# Patient Record
Sex: FEMALE | Race: WHITE | Employment: FULL TIME | URBAN - METROPOLITAN AREA
[De-identification: names, ages, dates, MRNs, and addresses within clinical notes are randomized per-mention and may not be internally consistent; named-entity substitution may affect disease eponyms.]

---

## 2018-05-20 ENCOUNTER — APPOINTMENT (OUTPATIENT)
Dept: CT IMAGING | Facility: CLINIC | Age: 33
DRG: 872 | End: 2018-05-20
Attending: INTERNAL MEDICINE
Payer: COMMERCIAL

## 2018-05-20 ENCOUNTER — APPOINTMENT (OUTPATIENT)
Dept: GENERAL RADIOLOGY | Facility: CLINIC | Age: 33
DRG: 872 | End: 2018-05-20
Attending: EMERGENCY MEDICINE
Payer: COMMERCIAL

## 2018-05-20 ENCOUNTER — HOSPITAL ENCOUNTER (INPATIENT)
Facility: CLINIC | Age: 33
LOS: 2 days | Discharge: HOME | DRG: 872 | End: 2018-05-22
Attending: EMERGENCY MEDICINE | Admitting: INTERNAL MEDICINE
Payer: COMMERCIAL

## 2018-05-20 DIAGNOSIS — M54.50 ACUTE RIGHT-SIDED LOW BACK PAIN WITHOUT SCIATICA: Primary | ICD-10-CM

## 2018-05-20 DIAGNOSIS — N10 ACUTE PYELONEPHRITIS: ICD-10-CM

## 2018-05-20 PROBLEM — N12 PYELONEPHRITIS: Status: ACTIVE | Noted: 2018-05-20

## 2018-05-20 LAB
ALBUMIN SERPL-MCNC: 3.6 G/DL (ref 3.4–5)
ALBUMIN UR-MCNC: 30 MG/DL
ALP SERPL-CCNC: 71 U/L (ref 40–150)
ALT SERPL W P-5'-P-CCNC: 21 U/L (ref 0–50)
ANION GAP SERPL CALCULATED.3IONS-SCNC: 12 MMOL/L (ref 3–14)
APPEARANCE UR: CLEAR
AST SERPL W P-5'-P-CCNC: 34 U/L (ref 0–45)
BACTERIA #/AREA URNS HPF: ABNORMAL /HPF
BASOPHILS # BLD AUTO: 0 10E9/L (ref 0–0.2)
BASOPHILS NFR BLD AUTO: 0.2 %
BILIRUB SERPL-MCNC: 0.5 MG/DL (ref 0.2–1.3)
BILIRUB UR QL STRIP: NEGATIVE
BUN SERPL-MCNC: 14 MG/DL (ref 7–30)
CALCIUM SERPL-MCNC: 7.9 MG/DL (ref 8.5–10.1)
CHLORIDE SERPL-SCNC: 103 MMOL/L (ref 94–109)
CO2 SERPL-SCNC: 21 MMOL/L (ref 20–32)
COLOR UR AUTO: YELLOW
CREAT SERPL-MCNC: 0.86 MG/DL (ref 0.52–1.04)
CREAT SERPL-MCNC: 0.87 MG/DL (ref 0.52–1.04)
CRP SERPL-MCNC: 86.5 MG/L (ref 0–8)
DIFFERENTIAL METHOD BLD: ABNORMAL
EOSINOPHIL # BLD AUTO: 0.1 10E9/L (ref 0–0.7)
EOSINOPHIL NFR BLD AUTO: 0.6 %
ERYTHROCYTE [DISTWIDTH] IN BLOOD BY AUTOMATED COUNT: 12.6 % (ref 10–15)
ERYTHROCYTE [SEDIMENTATION RATE] IN BLOOD BY WESTERGREN METHOD: 117 MM/H (ref 0–20)
GFR SERPL CREATININE-BSD FRML MDRD: 74 ML/MIN/1.7M2
GFR SERPL CREATININE-BSD FRML MDRD: 75 ML/MIN/1.7M2
GLUCOSE SERPL-MCNC: 96 MG/DL (ref 70–99)
GLUCOSE UR STRIP-MCNC: NEGATIVE MG/DL
HCT VFR BLD AUTO: 35.6 % (ref 35–47)
HGB BLD-MCNC: 12.4 G/DL (ref 11.7–15.7)
HGB UR QL STRIP: ABNORMAL
IMM GRANULOCYTES # BLD: 0 10E9/L (ref 0–0.4)
IMM GRANULOCYTES NFR BLD: 0.3 %
KETONES UR STRIP-MCNC: 10 MG/DL
LACTATE BLD-SCNC: 1.1 MMOL/L (ref 0.7–2)
LACTATE BLD-SCNC: 2.9 MMOL/L (ref 0.4–1.9)
LEUKOCYTE ESTERASE UR QL STRIP: ABNORMAL
LYMPHOCYTES # BLD AUTO: 1.5 10E9/L (ref 0.8–5.3)
LYMPHOCYTES NFR BLD AUTO: 9.3 %
MCH RBC QN AUTO: 31.2 PG (ref 26.5–33)
MCHC RBC AUTO-ENTMCNC: 34.8 G/DL (ref 31.5–36.5)
MCV RBC AUTO: 90 FL (ref 78–100)
MONOCYTES # BLD AUTO: 1.9 10E9/L (ref 0–1.3)
MONOCYTES NFR BLD AUTO: 12.1 %
MUCOUS THREADS #/AREA URNS LPF: PRESENT /LPF
NEUTROPHILS # BLD AUTO: 12.2 10E9/L (ref 1.6–8.3)
NEUTROPHILS NFR BLD AUTO: 77.5 %
NITRATE UR QL: NEGATIVE
PH UR STRIP: 5.5 PH (ref 5–7)
PLATELET # BLD AUTO: 194 10E9/L (ref 150–450)
PLATELET # BLD AUTO: 252 10E9/L (ref 150–450)
POTASSIUM SERPL-SCNC: 3.9 MMOL/L (ref 3.4–5.3)
PROT SERPL-MCNC: 9 G/DL (ref 6.8–8.8)
RBC # BLD AUTO: 3.97 10E12/L (ref 3.8–5.2)
RBC #/AREA URNS AUTO: 6 /HPF (ref 0–2)
SODIUM SERPL-SCNC: 136 MMOL/L (ref 133–144)
SOURCE: ABNORMAL
SP GR UR STRIP: 1.02 (ref 1–1.03)
SQUAMOUS #/AREA URNS AUTO: 1 /HPF (ref 0–1)
UROBILINOGEN UR STRIP-MCNC: NORMAL MG/DL (ref 0–2)
WBC # BLD AUTO: 15.7 10E9/L (ref 4–11)
WBC #/AREA URNS AUTO: 89 /HPF (ref 0–5)

## 2018-05-20 PROCEDURE — 85652 RBC SED RATE AUTOMATED: CPT | Performed by: EMERGENCY MEDICINE

## 2018-05-20 PROCEDURE — 25000128 H RX IP 250 OP 636: Performed by: INTERNAL MEDICINE

## 2018-05-20 PROCEDURE — 83605 ASSAY OF LACTIC ACID: CPT | Performed by: INTERNAL MEDICINE

## 2018-05-20 PROCEDURE — 85025 COMPLETE CBC W/AUTO DIFF WBC: CPT | Performed by: EMERGENCY MEDICINE

## 2018-05-20 PROCEDURE — 86140 C-REACTIVE PROTEIN: CPT | Performed by: EMERGENCY MEDICINE

## 2018-05-20 PROCEDURE — 80053 COMPREHEN METABOLIC PANEL: CPT | Performed by: EMERGENCY MEDICINE

## 2018-05-20 PROCEDURE — 87086 URINE CULTURE/COLONY COUNT: CPT | Performed by: EMERGENCY MEDICINE

## 2018-05-20 PROCEDURE — 25000125 ZZHC RX 250: Performed by: INTERNAL MEDICINE

## 2018-05-20 PROCEDURE — 25000132 ZZH RX MED GY IP 250 OP 250 PS 637: Performed by: INTERNAL MEDICINE

## 2018-05-20 PROCEDURE — 99223 1ST HOSP IP/OBS HIGH 75: CPT | Mod: AI | Performed by: INTERNAL MEDICINE

## 2018-05-20 PROCEDURE — 87186 SC STD MICRODIL/AGAR DIL: CPT | Performed by: EMERGENCY MEDICINE

## 2018-05-20 PROCEDURE — 12000000 ZZH R&B MED SURG/OB

## 2018-05-20 PROCEDURE — 85049 AUTOMATED PLATELET COUNT: CPT | Performed by: INTERNAL MEDICINE

## 2018-05-20 PROCEDURE — 36415 COLL VENOUS BLD VENIPUNCTURE: CPT | Performed by: INTERNAL MEDICINE

## 2018-05-20 PROCEDURE — 25000128 H RX IP 250 OP 636

## 2018-05-20 PROCEDURE — 82565 ASSAY OF CREATININE: CPT | Performed by: INTERNAL MEDICINE

## 2018-05-20 PROCEDURE — 99285 EMERGENCY DEPT VISIT HI MDM: CPT | Mod: 25

## 2018-05-20 PROCEDURE — 36415 COLL VENOUS BLD VENIPUNCTURE: CPT | Performed by: EMERGENCY MEDICINE

## 2018-05-20 PROCEDURE — 96375 TX/PRO/DX INJ NEW DRUG ADDON: CPT

## 2018-05-20 PROCEDURE — 25000128 H RX IP 250 OP 636: Performed by: EMERGENCY MEDICINE

## 2018-05-20 PROCEDURE — 83605 ASSAY OF LACTIC ACID: CPT | Performed by: EMERGENCY MEDICINE

## 2018-05-20 PROCEDURE — 74178 CT ABD&PLV WO CNTR FLWD CNTR: CPT

## 2018-05-20 PROCEDURE — 81001 URINALYSIS AUTO W/SCOPE: CPT | Performed by: EMERGENCY MEDICINE

## 2018-05-20 PROCEDURE — 96365 THER/PROPH/DIAG IV INF INIT: CPT

## 2018-05-20 PROCEDURE — 87040 BLOOD CULTURE FOR BACTERIA: CPT | Performed by: EMERGENCY MEDICINE

## 2018-05-20 PROCEDURE — 87088 URINE BACTERIA CULTURE: CPT | Performed by: EMERGENCY MEDICINE

## 2018-05-20 PROCEDURE — 87040 BLOOD CULTURE FOR BACTERIA: CPT | Performed by: INTERNAL MEDICINE

## 2018-05-20 PROCEDURE — 25000132 ZZH RX MED GY IP 250 OP 250 PS 637: Performed by: EMERGENCY MEDICINE

## 2018-05-20 PROCEDURE — 71046 X-RAY EXAM CHEST 2 VIEWS: CPT

## 2018-05-20 RX ORDER — AMOXICILLIN 250 MG
2 CAPSULE ORAL 2 TIMES DAILY PRN
Status: DISCONTINUED | OUTPATIENT
Start: 2018-05-20 | End: 2018-05-22 | Stop reason: HOSPADM

## 2018-05-20 RX ORDER — BENZONATATE 100 MG/1
100 CAPSULE ORAL 3 TIMES DAILY PRN
Status: DISCONTINUED | OUTPATIENT
Start: 2018-05-20 | End: 2018-05-22 | Stop reason: HOSPADM

## 2018-05-20 RX ORDER — NALOXONE HYDROCHLORIDE 0.4 MG/ML
.1-.4 INJECTION, SOLUTION INTRAMUSCULAR; INTRAVENOUS; SUBCUTANEOUS
Status: DISCONTINUED | OUTPATIENT
Start: 2018-05-20 | End: 2018-05-22 | Stop reason: HOSPADM

## 2018-05-20 RX ORDER — GUAIFENESIN 600 MG/1
600 TABLET, EXTENDED RELEASE ORAL 2 TIMES DAILY
Status: DISCONTINUED | OUTPATIENT
Start: 2018-05-20 | End: 2018-05-22 | Stop reason: HOSPADM

## 2018-05-20 RX ORDER — POTASSIUM CHLORIDE 1500 MG/1
20-40 TABLET, EXTENDED RELEASE ORAL
Status: DISCONTINUED | OUTPATIENT
Start: 2018-05-20 | End: 2018-05-22 | Stop reason: HOSPADM

## 2018-05-20 RX ORDER — LORATADINE 10 MG/1
10 TABLET ORAL DAILY
Status: DISCONTINUED | OUTPATIENT
Start: 2018-05-20 | End: 2018-05-22 | Stop reason: HOSPADM

## 2018-05-20 RX ORDER — LACTOBACILLUS RHAMNOSUS GG 10B CELL
1 CAPSULE ORAL 2 TIMES DAILY
Status: DISCONTINUED | OUTPATIENT
Start: 2018-05-20 | End: 2018-05-22 | Stop reason: HOSPADM

## 2018-05-20 RX ORDER — ACETAMINOPHEN 325 MG/1
650 TABLET ORAL EVERY 4 HOURS PRN
Status: DISCONTINUED | OUTPATIENT
Start: 2018-05-20 | End: 2018-05-22 | Stop reason: HOSPADM

## 2018-05-20 RX ORDER — METOCLOPRAMIDE HYDROCHLORIDE 5 MG/ML
10 INJECTION INTRAMUSCULAR; INTRAVENOUS EVERY 6 HOURS PRN
Status: DISCONTINUED | OUTPATIENT
Start: 2018-05-20 | End: 2018-05-22 | Stop reason: HOSPADM

## 2018-05-20 RX ORDER — CETIRIZINE HYDROCHLORIDE 10 MG/1
10 TABLET ORAL DAILY
Status: DISCONTINUED | OUTPATIENT
Start: 2018-05-20 | End: 2018-05-20

## 2018-05-20 RX ORDER — OXYCODONE HYDROCHLORIDE 5 MG/1
5 TABLET ORAL
Status: COMPLETED | OUTPATIENT
Start: 2018-05-20 | End: 2018-05-20

## 2018-05-20 RX ORDER — BISACODYL 10 MG
10 SUPPOSITORY, RECTAL RECTAL DAILY PRN
Status: DISCONTINUED | OUTPATIENT
Start: 2018-05-20 | End: 2018-05-22 | Stop reason: HOSPADM

## 2018-05-20 RX ORDER — AMOXICILLIN 250 MG
1 CAPSULE ORAL 2 TIMES DAILY PRN
Status: DISCONTINUED | OUTPATIENT
Start: 2018-05-20 | End: 2018-05-22 | Stop reason: HOSPADM

## 2018-05-20 RX ORDER — GUAIFENESIN 600 MG/1
600 TABLET, EXTENDED RELEASE ORAL 2 TIMES DAILY
COMMUNITY

## 2018-05-20 RX ORDER — ONDANSETRON 4 MG/1
4 TABLET, ORALLY DISINTEGRATING ORAL EVERY 6 HOURS PRN
Status: DISCONTINUED | OUTPATIENT
Start: 2018-05-20 | End: 2018-05-22 | Stop reason: HOSPADM

## 2018-05-20 RX ORDER — METOCLOPRAMIDE 10 MG/1
10 TABLET ORAL EVERY 6 HOURS PRN
Status: DISCONTINUED | OUTPATIENT
Start: 2018-05-20 | End: 2018-05-22 | Stop reason: HOSPADM

## 2018-05-20 RX ORDER — OXYCODONE HYDROCHLORIDE 5 MG/1
5 TABLET ORAL
Status: DISCONTINUED | OUTPATIENT
Start: 2018-05-20 | End: 2018-05-21

## 2018-05-20 RX ORDER — ONDANSETRON 2 MG/ML
4 INJECTION INTRAMUSCULAR; INTRAVENOUS EVERY 6 HOURS PRN
Status: DISCONTINUED | OUTPATIENT
Start: 2018-05-20 | End: 2018-05-22 | Stop reason: HOSPADM

## 2018-05-20 RX ORDER — ONDANSETRON 2 MG/ML
4 INJECTION INTRAMUSCULAR; INTRAVENOUS ONCE
Status: COMPLETED | OUTPATIENT
Start: 2018-05-20 | End: 2018-05-20

## 2018-05-20 RX ORDER — ONDANSETRON 2 MG/ML
INJECTION INTRAMUSCULAR; INTRAVENOUS
Status: COMPLETED
Start: 2018-05-20 | End: 2018-05-20

## 2018-05-20 RX ORDER — POTASSIUM CHLORIDE 1.5 G/1.58G
20-40 POWDER, FOR SOLUTION ORAL
Status: DISCONTINUED | OUTPATIENT
Start: 2018-05-20 | End: 2018-05-22 | Stop reason: HOSPADM

## 2018-05-20 RX ORDER — IOPAMIDOL 755 MG/ML
100 INJECTION, SOLUTION INTRAVASCULAR ONCE
Status: COMPLETED | OUTPATIENT
Start: 2018-05-20 | End: 2018-05-20

## 2018-05-20 RX ORDER — POLYETHYLENE GLYCOL 3350 17 G/17G
17 POWDER, FOR SOLUTION ORAL DAILY PRN
Status: DISCONTINUED | OUTPATIENT
Start: 2018-05-20 | End: 2018-05-22 | Stop reason: HOSPADM

## 2018-05-20 RX ORDER — SODIUM CHLORIDE 9 MG/ML
INJECTION, SOLUTION INTRAVENOUS CONTINUOUS
Status: DISCONTINUED | OUTPATIENT
Start: 2018-05-20 | End: 2018-05-22

## 2018-05-20 RX ORDER — MAGNESIUM SULFATE HEPTAHYDRATE 40 MG/ML
4 INJECTION, SOLUTION INTRAVENOUS EVERY 4 HOURS PRN
Status: DISCONTINUED | OUTPATIENT
Start: 2018-05-20 | End: 2018-05-22 | Stop reason: HOSPADM

## 2018-05-20 RX ORDER — LIDOCAINE 40 MG/G
CREAM TOPICAL
Status: DISCONTINUED | OUTPATIENT
Start: 2018-05-20 | End: 2018-05-20

## 2018-05-20 RX ORDER — LEVOFLOXACIN 5 MG/ML
750 INJECTION, SOLUTION INTRAVENOUS ONCE
Status: COMPLETED | OUTPATIENT
Start: 2018-05-20 | End: 2018-05-20

## 2018-05-20 RX ORDER — POTASSIUM CHLORIDE 29.8 MG/ML
20 INJECTION INTRAVENOUS
Status: DISCONTINUED | OUTPATIENT
Start: 2018-05-20 | End: 2018-05-22 | Stop reason: HOSPADM

## 2018-05-20 RX ORDER — LIDOCAINE 40 MG/G
CREAM TOPICAL
Status: DISCONTINUED | OUTPATIENT
Start: 2018-05-20 | End: 2018-05-22 | Stop reason: HOSPADM

## 2018-05-20 RX ORDER — IBUPROFEN 600 MG/1
600 TABLET, FILM COATED ORAL EVERY 6 HOURS PRN
Status: DISCONTINUED | OUTPATIENT
Start: 2018-05-20 | End: 2018-05-22 | Stop reason: HOSPADM

## 2018-05-20 RX ORDER — LORATADINE 10 MG/1
10 TABLET ORAL DAILY
COMMUNITY

## 2018-05-20 RX ORDER — POTASSIUM CL/LIDO/0.9 % NACL 10MEQ/0.1L
10 INTRAVENOUS SOLUTION, PIGGYBACK (ML) INTRAVENOUS
Status: DISCONTINUED | OUTPATIENT
Start: 2018-05-20 | End: 2018-05-22 | Stop reason: HOSPADM

## 2018-05-20 RX ORDER — POTASSIUM CHLORIDE 7.45 MG/ML
10 INJECTION INTRAVENOUS
Status: DISCONTINUED | OUTPATIENT
Start: 2018-05-20 | End: 2018-05-22 | Stop reason: HOSPADM

## 2018-05-20 RX ORDER — PYRIDOXINE HCL (VITAMIN B6) 100 MG
1 TABLET ORAL DAILY
COMMUNITY

## 2018-05-20 RX ORDER — PROCHLORPERAZINE 25 MG
25 SUPPOSITORY, RECTAL RECTAL EVERY 12 HOURS PRN
Status: DISCONTINUED | OUTPATIENT
Start: 2018-05-20 | End: 2018-05-22 | Stop reason: HOSPADM

## 2018-05-20 RX ORDER — LEVOFLOXACIN 5 MG/ML
500 INJECTION, SOLUTION INTRAVENOUS EVERY 24 HOURS
Status: DISCONTINUED | OUTPATIENT
Start: 2018-05-21 | End: 2018-05-22 | Stop reason: HOSPADM

## 2018-05-20 RX ORDER — IBUPROFEN 400 MG/1
800 TABLET, FILM COATED ORAL ONCE
Status: COMPLETED | OUTPATIENT
Start: 2018-05-20 | End: 2018-05-20

## 2018-05-20 RX ORDER — PROCHLORPERAZINE MALEATE 10 MG
10 TABLET ORAL EVERY 6 HOURS PRN
Status: DISCONTINUED | OUTPATIENT
Start: 2018-05-20 | End: 2018-05-22 | Stop reason: HOSPADM

## 2018-05-20 RX ORDER — MULTIPLE VITAMINS W/ MINERALS TAB 9MG-400MCG
1 TAB ORAL DAILY
COMMUNITY

## 2018-05-20 RX ORDER — LIDOCAINE 40 MG/G
CREAM TOPICAL 2 TIMES DAILY PRN
Status: DISCONTINUED | OUTPATIENT
Start: 2018-05-20 | End: 2018-05-22 | Stop reason: HOSPADM

## 2018-05-20 RX ORDER — MORPHINE SULFATE 4 MG/ML
6 INJECTION, SOLUTION INTRAMUSCULAR; INTRAVENOUS ONCE
Status: COMPLETED | OUTPATIENT
Start: 2018-05-20 | End: 2018-05-20

## 2018-05-20 RX ADMIN — POLYETHYLENE GLYCOL 3350 17 G: 17 POWDER, FOR SOLUTION ORAL at 16:07

## 2018-05-20 RX ADMIN — ONDANSETRON 4 MG: 2 INJECTION INTRAMUSCULAR; INTRAVENOUS at 01:52

## 2018-05-20 RX ADMIN — ACETAMINOPHEN 650 MG: 325 TABLET ORAL at 04:50

## 2018-05-20 RX ADMIN — LIDOCAINE: 40 CREAM TOPICAL at 04:50

## 2018-05-20 RX ADMIN — BENZONATATE 100 MG: 100 CAPSULE ORAL at 16:05

## 2018-05-20 RX ADMIN — PROCHLORPERAZINE EDISYLATE 10 MG: 5 INJECTION INTRAMUSCULAR; INTRAVENOUS at 16:00

## 2018-05-20 RX ADMIN — MORPHINE SULFATE 6 MG: 4 INJECTION, SOLUTION INTRAMUSCULAR; INTRAVENOUS at 01:42

## 2018-05-20 RX ADMIN — DEXTRAN 70, AND HYPROMELLOSE 2910 3 DROP: 1; 3 SOLUTION/ DROPS OPHTHALMIC at 09:57

## 2018-05-20 RX ADMIN — SODIUM CHLORIDE: 9 INJECTION, SOLUTION INTRAVENOUS at 17:13

## 2018-05-20 RX ADMIN — OXYCODONE HYDROCHLORIDE 5 MG: 5 TABLET ORAL at 16:05

## 2018-05-20 RX ADMIN — OXYCODONE HYDROCHLORIDE 5 MG: 5 TABLET ORAL at 21:30

## 2018-05-20 RX ADMIN — OXYCODONE HYDROCHLORIDE 5 MG: 5 TABLET ORAL at 03:53

## 2018-05-20 RX ADMIN — ACETAMINOPHEN 650 MG: 325 TABLET ORAL at 18:40

## 2018-05-20 RX ADMIN — IOPAMIDOL 100 ML: 755 INJECTION, SOLUTION INTRAVENOUS at 15:09

## 2018-05-20 RX ADMIN — SENNOSIDES AND DOCUSATE SODIUM 1 TABLET: 8.6; 5 TABLET ORAL at 16:06

## 2018-05-20 RX ADMIN — SODIUM CHLORIDE 60 ML: 9 INJECTION, SOLUTION INTRAVENOUS at 15:09

## 2018-05-20 RX ADMIN — SODIUM CHLORIDE 1000 ML: 9 INJECTION, SOLUTION INTRAVENOUS at 03:59

## 2018-05-20 RX ADMIN — BENZONATATE 100 MG: 100 CAPSULE ORAL at 08:14

## 2018-05-20 RX ADMIN — ACETAMINOPHEN 650 MG: 325 TABLET ORAL at 22:50

## 2018-05-20 RX ADMIN — CETIRIZINE HYDROCHLORIDE 10 MG: 10 TABLET, FILM COATED ORAL at 08:01

## 2018-05-20 RX ADMIN — PROCHLORPERAZINE EDISYLATE 10 MG: 5 INJECTION INTRAMUSCULAR; INTRAVENOUS at 04:14

## 2018-05-20 RX ADMIN — LEVOFLOXACIN 750 MG: 5 INJECTION, SOLUTION INTRAVENOUS at 02:26

## 2018-05-20 RX ADMIN — ACETAMINOPHEN 650 MG: 325 TABLET ORAL at 09:50

## 2018-05-20 RX ADMIN — LORATADINE 10 MG: 10 TABLET ORAL at 20:35

## 2018-05-20 RX ADMIN — Medication 1 CAPSULE: at 20:35

## 2018-05-20 RX ADMIN — ACETAMINOPHEN 650 MG: 325 TABLET ORAL at 13:51

## 2018-05-20 RX ADMIN — OXYCODONE HYDROCHLORIDE 5 MG: 5 TABLET ORAL at 04:50

## 2018-05-20 RX ADMIN — ONDANSETRON 4 MG: 2 INJECTION INTRAMUSCULAR; INTRAVENOUS at 12:00

## 2018-05-20 RX ADMIN — OXYCODONE HYDROCHLORIDE 5 MG: 5 TABLET ORAL at 11:49

## 2018-05-20 RX ADMIN — SODIUM CHLORIDE 1000 ML: 9 INJECTION, SOLUTION INTRAVENOUS at 01:42

## 2018-05-20 RX ADMIN — SODIUM CHLORIDE 1000 ML: 9 INJECTION, SOLUTION INTRAVENOUS at 08:48

## 2018-05-20 RX ADMIN — Medication 1 CAPSULE: at 08:01

## 2018-05-20 RX ADMIN — OXYCODONE HYDROCHLORIDE 5 MG: 5 TABLET ORAL at 08:01

## 2018-05-20 RX ADMIN — SODIUM CHLORIDE: 9 INJECTION, SOLUTION INTRAVENOUS at 03:53

## 2018-05-20 RX ADMIN — GUAIFENESIN 600 MG: 600 TABLET, EXTENDED RELEASE ORAL at 20:35

## 2018-05-20 RX ADMIN — IBUPROFEN 800 MG: 400 TABLET ORAL at 01:41

## 2018-05-20 ASSESSMENT — ENCOUNTER SYMPTOMS
NUMBNESS: 0
WEAKNESS: 0
FEVER: 1
MYALGIAS: 0
BACK PAIN: 1

## 2018-05-20 ASSESSMENT — PAIN DESCRIPTION - DESCRIPTORS
DESCRIPTORS: THROBBING
DESCRIPTORS: SHARP
DESCRIPTORS: SHARP;SHOOTING

## 2018-05-20 NOTE — PROGRESS NOTES
[facility]    Hospitalist Progress Note  Mervin Gatica MD    Assessment & Plan      32 year old female, who presented on 5/20/18 with fever of 101F, shaking chills, nausea, right-sided paraspinal back pain. Vitals on admission revealed, temp 101.5F, B.P. 139/88, , Resp 20, O2 Sats 95% on RA. Work up done on 5/20/18 revealed, CMP significant for Ca2+ 7.9. CRP was 86.5. Lactic acid was 1.1. CBC revealed, WBC 15.7.   UA revealed, small blood, large leucocyte esterase, WBC 89, RBC 6, few bacteria. Chest x-rays on 5/28/18 was normal.        1. Right pyelonephritis with sepsis: CT abd/pelvis done on 5/20/18 revealed, perinephric stranding on the right compatible for pyelonephritis. There was not renal, ureteral or bladder calculi. There areas of cortical scarring in the right kidney, compatible with prior infectious change. Awaiting urine and blood culture results. Continue IV levaquin qd. For lactobacillus. Continue tylenol prn, ibuprofen prn, and Oxycodone IR prn. Continue IV N/Saline. Continue antiemetics prn.           DVT Prophylaxis: Pneumatic Compression Devices  Code Status: Full Code    Disposition: Expected discharge in 1-2 days.      Interval History   The pt reported having pain in both flanks today. She stated that her right flank pain radiated to her right hip. Her B.P. was 95/51mmHg this am and this improved to 104/63mmHg after 1L of N/Saline bolus.     -Data reviewed today: I reviewed all new labs and imaging results over the last 24 hours. I personally reviewed no images or EKG's today.    Physical Exam   Temp: 96.3  F (35.7  C) Temp src: Oral BP: 95/51   Heart Rate: 71 Resp: 16 SpO2: 99 % O2 Device: None (Room air)    Vitals:    05/20/18 0057 05/20/18 0333   Weight: 90.7 kg (200 lb) 91.9 kg (202 lb 9.6 oz)     Vital Signs with Ranges  Temp:  [96.3  F (35.7  C)-101.5  F (38.6  C)] 96.3  F (35.7  C)  Heart Rate:  [] 71  Resp:  [16-20] 16  BP: ()/(46-88)  95/51  SpO2:  [95 %-99 %] 99 %  I/O last 3 completed shifts:  In: 1000 [IV Piggyback:1000]  Out: 100 [Urine:100]    Constitutional: Adult female, awake, cooperative, no apparent distress, O2 Sats 100% on RA  Respiratory: BS vesicular bilaterally, no crackles or wheezing  Cardiovascular: S1 and S2, reg, no murmur noted  GI: Soft, non-distended, non-tender, no masses, BS present+, mild right flank percussion tenderness  Skin/Integumen: No rashes  Extremities: No pedal edema    Medications     sodium chloride 125 mL/hr at 05/20/18 0353       sodium chloride 0.9%  1,000 mL Intravenous Once     cetirizine  10 mg Oral Daily     lactobacillus rhamnosus (GG)  1 capsule Oral BID     [START ON 5/21/2018] levofloxacin  500 mg Intravenous Q24H     sodium chloride (PF)  3 mL Intracatheter Q8H       Data     Recent Labs  Lab 05/20/18  0707 05/20/18  0134   WBC  --  15.7*   HGB  --  12.4   MCV  --  90    252   NA  --  136   POTASSIUM  --  3.9   CHLORIDE  --  103   CO2  --  21   BUN  --  14   CR 0.87 0.86   ANIONGAP  --  12   SILAS  --  7.9*   GLC  --  96   ALBUMIN  --  3.6   PROTTOTAL  --  9.0*   BILITOTAL  --  0.5   ALKPHOS  --  71   ALT  --  21   AST  --  34       Recent Results (from the past 24 hour(s))   XR Chest 2 Views    Narrative    XR CHEST 2 VW 5/20/2018 2:01 AM    HISTORY: Fever.    COMPARISON: None.      Impression    IMPRESSION: The lungs are clear. No focal pulmonary opacities. Heart  and mediastinum are unremarkable. No acute cardiopulmonary  abnormalities.    GAY ROMERO MD

## 2018-05-20 NOTE — ED NOTES
"[facility]  ED Nurse Handoff Report    ED Chief complaint: Back Pain (normally seen at [facility], but didnt want to go back because of negative experience.  R lower back pain, initially thought it was from raking but now has fever) and Fever (102.5 at home in spite of tylenol Ex St)      ED Diagnosis:   Final diagnoses:   Acute pyelonephritis       Code Status: Full Code    Allergies:   Allergies   Allergen Reactions     Cefixime Hives     Penicillins Hives     Suprax [Cephalosporins]      Suprax- Unknown reaction       Activity level - Baseline/Home:  Independent    Activity Level - Current:   Independent     Needed?: No    Isolation: No  Infection: Not Applicable    Bariatric?: No    Vital Signs:   Vitals:    05/20/18 0057 05/20/18 0145 05/20/18 0202 05/20/18 0225   BP: 139/88 115/63 119/84    Resp: 20      Temp: 101.5  F (38.6  C)      TempSrc: Oral   Oral   SpO2: 95% 99% 98%    Weight: 90.7 kg (200 lb)      Height: 1.651 m (5' 5\")          Cardiac Rhythm: ,        Pain level: 0-10 Pain Scale: 7    Is this patient confused?: No   Suicidality: Screened negative    Patient Report: Initial Complaint: patient here with right flank pain for 2 days now tonight she developed a fever.She also c/o a dry cough for a few days  Focused Assessment:right flank pain with feverTests Performed:lab and chest xray Abnormal Results: see lab results. Test indicate pylonephritis  Treatments provided: ibuprofen,morphine and IV levaquin along with 1 liter of normal saline  Family Comments: none  OBS brochure/video discussed/provided to patient: N/A    ED Medications:   Medications   0.9% sodium chloride BOLUS (1,000 mLs Intravenous New Bag 5/20/18 0142)   levofloxacin (LEVAQUIN) infusion 750 mg (750 mg Intravenous New Bag 5/20/18 0226)   0.9% sodium chloride BOLUS (not administered)   ibuprofen (ADVIL/MOTRIN) tablet 800 mg (800 mg Oral Given 5/20/18 0141)   morphine (PF) injection 6 mg (6 mg Intravenous Given " 5/20/18 0142)   ondansetron (ZOFRAN) injection 4 mg (4 mg Intravenous Given 5/20/18 0152)       Drips infusing?:  Yes    For the majority of the shift this patient was Green.   Interventions performed were none.    Severe Sepsis OR Septic Shock Diagnosis Present: No      ED NURSE PHONE NUMBER: [phone]

## 2018-05-20 NOTE — ED PROVIDER NOTES
"  History     Chief Complaint:  Back Pain and Fever    HPI   Lilibeth Santizo is a 32 year old female with a history of mastoiditis who presents to the emergency department for evaluation of back pain and fever, Of note, the patient reports having been hospitalized at [facility] six weeks ago for mastoiditis (possible meningitis), for which she was admitted for 4 days. The patient was then discharged with oral antibiotics, though again returned to the hospital due to persistent diarrhea after taking the antibiotics. After this hospitalization, the patient was generally feeling at her baseline. She was doing some yard work earlier in the week and then subsequently developed right low back pain.  She treated this pain to a manageable level by using heated packs and Tylenol, but states the pain had a sudden increase this evening to a \"10/10\" as she was returning home from a party. The patient additionally felt nauseated, though did not vomit. When she returned home, she checked her temperature, which was measured at 101.4 F. This fever, along with her sudden worsening pain, prompted her ED visit. The patient took a dose of Tylenol prior to arrival in the ED. She has had a persistent dry cough for the past week, though feels that this is likely related to her seasonal allergies and has otherwise been feeling well. She denies any recent lower extremity weakness, numbness, or tingling.     Allergies:  Penicillins  Suprax [Cephalosporins]     Medications:    ibuprofen    Past Medical History:    Mastoiditis    Past Surgical History:    Multiple ear surgeries  Orthopedic ankle surgery  Tubal ligations      Family History:    No past pertinent family history.    Social History:  Presents alone.  Positive for alcohol use.   Never smoker.    Review of Systems   Constitutional: Positive for fever.   Musculoskeletal: Positive for back pain. Negative for myalgias.   Neurological: Negative for weakness and numbness.   All " "other systems reviewed and are negative.    Physical Exam     Patient Vitals for the past 24 hrs:   BP Temp Temp src Heart Rate Resp SpO2 Height Weight   05/20/18 0057 139/88 101.5  F (38.6  C) Oral 128 20 95 % 1.651 m (5' 5\") 90.7 kg (200 lb)       Physical Exam  Vitals: reviewed by me  General: Pt seen on \[facility] [place] [facility]\"", pleasant, cooperative, and alert to conversation, does appear slightly uncomfortable  Eyes: Tracking well, clear conjunctiva BL  ENT: MMM, midline trachea.   Lungs:   No tachypnea, no accessory muscle use. No respiratory distress.   CV: Rate as above, regular rhythm.    Abd: Soft, minimally tender to the right flank, and right upper quadrant, no guarding or rebound.  Soft abdomen overall.  MSK: no peripheral edema or joint effusion.  No evidence of trauma.  Patient's back has no midline tenderness to CTL or S spine.  Does have minimal right perithoracic and paralumbar pain to palpation.  No skin changes or vesicles in this area.  Skin: No rash, normal turgor and temperature  Neuro: Clear speech and no facial droop.  Psych: Not RIS, no e/o AH/VH      Emergency Department Course   Imaging:  Radiographic findings were communicated with the patient who voiced understanding of the findings.    XR Chest 2 views:   The lungs are clear. No focal pulmonary opacities. Heart  and mediastinum are unremarkable. No acute cardiopulmonary  Abnormalities. As per radiology.     Laboratory:  CBC: WBC: 15.7 (H), HGB: 12.4, PLT: 252  CMP: Calcium 7.9 (L), Protein Total 9.0 (H) o/w WNL (Creatinine: 0.86)    Lactic acid whole blood: 1.1    Erythrocyte sedimentation rate auto: 117 (H)    CRP inflammation: 86.5 (H)    UA with micro: Urineketon 10 (A), Urine Blood Small (A), Protein Albumin Urine 30 (A), Leukocyte Esterase Urine Large (A),  WBC/HPF 89 (H), RBC/HPF 6 (H), Bacteria Few (A), Mucous Urine Present (A), o/w negative    Interventions:  0141 Ibuprofen tablet 800 mg PO  0142 NS 1L IV   Morphine injection 6 mg " IV  0152 Zofran 4 mg IV  0226 Levaquin infusion 750 mg IV     Emergency Department Course:  Nursing notes and vitals reviewed. 0112 I performed an exam of the patient as documented above.     IV inserted. Medicine administered as documented above. Blood drawn. This was sent to the lab for further testing, results above.    The patient was sent for a XR Chest while in the emergency department, findings above.     The patient provided a urine sample here in the emergency department. This was sent for laboratory testing, findings above.     0218 I rechecked the patient and discussed the results of her workup thus far.     0232 I spoke with Dr. Gabriel, hospitalist, about admitting the patient. He accepted the patient for admission.    Findings and plan explained to the Patient who consents to admission. Discussed the patient with Dr. Gabriel, who will admit the patient to a medical bed for further monitoring, evaluation, and treatment.    Impression & Plan    Medical Decision Making:  Lilibeth Santizo is a 32 year old female presenting to the ED today with back pain and fever, likely due to pyelonephritis. I feel that this diagnosis was supported by the patient's robust urinalysis as well as white count, right sided CVA tenderness, abdominal pain, and fever. Patient's pain is constant, no evidence of kidney stone, no significant hematuria, no history of stones or evidence of obstruction. Patient has a normal creatinine here. I see no evidence of spinal epidural abscess given no pain to her midline spine and no risk factors. I do appreciate the patient's long hospitalization at [facility] recently, but I think this may be a separate issue, and as such we will give Levofloxacin in keeping with her last urine culture from 2009. We will admit to the care of Dr. Gabriel, who agrees to accept care of the patient. No evidence of severe sepsis but does have persistent abnormal vital signs. We will give IV fluids, as well as  antibiotics as above. Patient is okay with plan.    Diagnosis:    ICD-10-CM   1. Acute pyelonephritis N10     Disposition:  Admitted to Dr. Gabriel, hospitalist    I, Declan Almanza, am serving as a scribe on 5/20/2018 at 1:12 AM to personally document services performed by Akhil Wyman MD based on my observations and the provider's statements to me.     Poonam Okeefe  5/20/2018    EMERGENCY DEPARTMENT     Tyshawn Wyman MD  05/20/18 0402

## 2018-05-20 NOTE — ED NOTES
Pt. Back from imaging, pt back on monitoring devices and given another warm blanket to use as a warm pack for her back.

## 2018-05-20 NOTE — PROGRESS NOTES
RECEIVING UNIT ED HANDOFF REVIEW    ED Nurse Handoff Report was reviewed by: Kell Arteaga on May 20, 2018 at 3:00 AM

## 2018-05-20 NOTE — IP AVS SNAPSHOT
MRN:[id number]                      After Visit Summary   5/20/2018    Lilibeth Santizo    MRN: [id number]           Thank you!     Thank you for choosing [facility] for your care. Our goal is always to provide you with excellent care. Hearing back from our patients is one way we can continue to improve our services. Please take a few minutes to complete the written survey that you may receive in the mail after you visit with us. Thank you!        Patient Information     Date Of Birth          1985        Designated Caregiver       Most Recent Value    Caregiver    Will someone help with your care after discharge? yes    Name of designated caregiver Viktor    Phone number of caregiver [phone]    Caregiver address Same as pt      About your hospital stay     You were admitted on:  May 20, 2018 You last received care in the:  [facility] [address] [facility]    You were discharged on:  May 22, 2018        Reason for your hospital stay       You were admitted with pyelonephritis and will complete a total of 7 days of antibiotics for this.                  Who to Call     For medical emergencies, please call 911.  For non-urgent questions about your medical care, please call your primary care provider or clinic, None          Attending Provider     Provider Specialty    Tyshawn Wyman MD Emergency Medicine    Gabriel, Randall Vega MD Internal Medicine       Primary Care Provider Fax #    Physician No Ref-Primary [phone]      After Care Instructions     Activity       Your activity upon discharge: activity as tolerated            Diet       Follow this diet upon discharge: Orders Placed This Encounter      Combination Diet Regular Diet Adult                  Follow-up Appointments     Follow-up and recommended labs and tests        Follow up with PCP in 7 days                  Pending Results     Date and Time Order Name Status Description    5/20/2018 0317 Blood culture  "Preliminary     2018 0317 Blood culture Preliminary             Statement of Approval     Ordered          18 0735  I have reviewed and agree with all the recommendations and orders detailed in this document.  EFFECTIVE NOW     Approved and electronically signed by:  Shawn Sauceda DO             Admission Information     Date & Time Provider Department Dept. Phone    2018 Gabriel, Randall Vega MD [place] [address] [facility] [phone]      Your Vitals Were     Blood Pressure Temperature Respirations Height Weight Last Period    116/62 (BP Location: Right arm) 96.4  F (35.8  C) (Oral) 16 1.651 m (5' 5\") 91.9 kg (202 lb 9.6 oz) 2018    Pulse Oximetry BMI (Body Mass Index)                100% 33.71 kg/m2          MyChart Information     [facility] lets you send messages to your doctor, view your test results, renew your prescriptions, schedule appointments and more. To sign up, go to [email or web address] . Click on \"Log in\" on the left side of the screen, which will take you to the Welcome page. Then click on \"Sign up Now\" on the right side of the page.     You will be asked to enter the access code listed below, as well as some personal information. Please follow the directions to create your username and password.     Your access code is: [email or web address]  Expires: 2018  8:49 AM     Your access code will  in 90 days. If you need help or a new code, please call your [facility] clinic or [phone].        Care EveryWhere ID     This is your Care EveryWhere ID. This could be used by other organizations to access your [facility] medical records  [id number]        Equal Access to Services     ESMER GARZON : Hadii zhane Adams, chun crenshaw, bree ganalnathen vasquez. So [facility] [phone].    ATENCIÓN: Si habla español, tiene a harrison disposición servicios gratuitos de asistencia lingüística. Llame al [phone].    We comply " with applicable federal civil rights laws and [place] laws. We do not discriminate on the basis of race, color, national origin, age, disability, sex, sexual orientation, or gender identity.               Review of your medicines      START taking        Dose / Directions    ibuprofen 600 MG tablet   Commonly known as:  ADVIL/MOTRIN   Used for:  Acute right-sided low back pain without sciatica        Dose:  600 mg   Take 1 tablet (600 mg) by mouth every 6 hours as needed for other (mild pain)   Quantity:  60 tablet   Refills:  0       lactobacillus rhamnosus (GG) capsule   Used for:  Acute pyelonephritis        Dose:  1 capsule   Take 1 capsule by mouth 2 times daily   Quantity:  30 capsule   Refills:  0       levofloxacin 500 MG tablet   Commonly known as:  LEVAQUIN   Used for:  Acute pyelonephritis        Dose:  500 mg   Start taking on:  5/23/2018   Take 1 tablet (500 mg) by mouth daily for 4 days   Quantity:  4 tablet   Refills:  0       traMADol 50 MG tablet   Commonly known as:  ULTRAM   Used for:  Acute pyelonephritis        Dose:  50 mg   Take 1 tablet (50 mg) by mouth every 6 hours as needed for moderate pain   Quantity:  10 tablet   Refills:  0         CONTINUE these medicines which have NOT CHANGED        Dose / Directions    BLINK TEARS 0.25 % Soln ophthalmic solution   Generic drug:  polyethylene glycol 400        Dose:  2 drop   Place 2 drops into both eyes 2 times daily as needed for dry eyes   Refills:  0       Cranberry 500 MG Caps        Dose:  1 capsule   Take 1 capsule by mouth daily   Refills:  0       guaiFENesin 600 MG 12 hr tablet   Commonly known as:  MUCINEX        Dose:  600 mg   Take 600 mg by mouth 2 times daily   Refills:  0       loratadine 10 MG tablet   Commonly known as:  CLARITIN        Dose:  10 mg   Take 10 mg by mouth daily   Refills:  0       multivitamin, therapeutic with minerals Tabs tablet        Dose:  1 tablet   Take 1 tablet by mouth daily   Refills:  0       VITAMIN  D-3 PO        Dose:  1000 Units   Take 1,000 Units by mouth daily   Refills:  0            Where to get your medicines      These medications were sent to [facility] [place], [place] - [address]  [address] [place] [address]     Phone:  [phone]     ibuprofen 600 MG tablet    levofloxacin 500 MG tablet         Some of these will need a paper prescription and others can be bought over the counter. Ask your nurse if you have questions.     Bring a paper prescription for each of these medications     lactobacillus rhamnosus (GG) capsule    traMADol 50 MG tablet                Protect others around you: Learn how to safely use, store and throw away your medicines at www.disposemymeds.org.        ANTIBIOTIC INSTRUCTION     You've Been Prescribed an Antibiotic - Now What?  Your healthcare team thinks that you or your loved one might have an infection. Some infections can be treated with antibiotics, which are powerful, life-saving drugs. Like all medications, antibiotics have side effects and should only be used when necessary. There are some important things you should know about your antibiotic treatment.      Your healthcare team may run tests before you start taking an antibiotic.    Your team may take samples (e.g., from your blood, urine or other areas) to run tests to look for bacteria. These test can be important to determine if you need an antibiotic at all and, if you do, which antibiotic will work best.      Within a few days, your healthcare team might change or even stop your antibiotic.    Your team may start you on an antibiotic while they are working to find out what is making you sick.    Your team might change your antibiotic because test results show that a different antibiotic would be better to treat your infection.    In some cases, once your team has more information, they learn that you do not need an antibiotic at all. They may find out that you don't have an  infection, or that the antibiotic you're taking won't work against your infection. For example, an infection caused by a virus can't be treated with antibiotics. Staying on an antibiotic when you don't need it is more likely to be harmful than helpful.      You may experience side effects from your antibiotic.    Like all medications, antibiotics have side effects. Some of these can be serious.    Let you healthcare team know if you have any known allergies when you are admitted to the hospital.    One significant side effect of nearly all antibiotics is the risk of severe and sometimes deadly diarrhea caused by Clostridium difficile (C. Difficile). This occurs when a person takes antibiotics because some good germs are destroyed. Antibiotic use allows C. diificile to take over, putting patients at high risk for this serious infection.    As a patient or caregiver, it is important to understand your or your loved one's antibiotic treatment. It is especially important for caregivers to speak up when patients can't speak for themselves. Here are some important questions to ask your healthcare team.    What infection is this antibiotic treating and how do you know I have that infection?    What side effects might occur from this antibiotic?    How long will I need to take this antibiotic?    Is it safe to take this antibiotic with other medications or supplements (e.g., vitamins) that I am taking?     Are there any special directions I need to know about taking this antibiotic? For example, should I take it with food?    How will I be monitored to know whether my infection is responding to the antibiotic?    What tests may help to make sure the right antibiotic is prescribed for me?      Information provided by:  www.cdc.gov/getsmart  U.S. Department of Health and Human Services  Centers for disease Control and Prevention  National Center for Emerging and Zoonotic Infectious Diseases  Division of Healthcare Quality  Promotion        Information about OPIOIDS     PRESCRIPTION OPIOIDS: WHAT YOU NEED TO KNOW   You have a prescription for an opioid (narcotic) pain medicine. Opioids can cause addiction. If you have a history of chemical dependency of any type, you are at a higher risk of becoming addicted to opioids. Only take this medicine after all other options have been tried. Take it for as short a time and as few doses as possible.     Do not:    Drive. If you drive while taking these medicines, you could be arrested for driving under the influence (DUI).    Operate heavy machinery    Do any other dangerous activities while taking these medicines.     Drink any alcohol while taking these medicines.      Take with any other medicines that contain acetaminophen. Read all labels carefully. Look for the word  acetaminophen  or  Tylenol.  Ask your pharmacist if you have questions or are unsure.    Store your pills in a secure place, locked if possible. We will not replace any lost or stolen medicine. If you don t finish your medicine, please throw away (dispose) as directed by your pharmacist. The [place] Pollution Control Agency has more information about safe disposal: https://www.pca.[facility].[place].us/living-green/managing-unwanted-medications    All opioids tend to cause constipation. Drink plenty of water and eat foods that have a lot of fiber, such as fruits, vegetables, prune juice, apple juice and high-fiber cereal. Take a laxative (Miralax, milk of magnesia, Colace, Senna) if you don t move your bowels at least every other day.              Medication List: This is a list of all your medications and when to take them. Check marks below indicate your daily home schedule. Keep this list as a reference.      Medications           Morning Afternoon Evening Bedtime As Needed    BLINK TEARS 0.25 % Soln ophthalmic solution   Place 2 drops into both eyes 2 times daily as needed for dry eyes   Generic drug:  polyethylene glycol 400    Next Dose Due:  Available when needed.                                   Cranberry 500 MG Caps   Take 1 capsule by mouth daily   Next Dose Due:  Resume home schedule                                   guaiFENesin 600 MG 12 hr tablet   Commonly known as:  MUCINEX   Take 600 mg by mouth 2 times daily   Last time this was given:  600 mg on 5/22/2018  8:02 AM   Next Dose Due:  Tonight 5/22/18 @ bedtime                                      ibuprofen 600 MG tablet   Commonly known as:  ADVIL/MOTRIN   Take 1 tablet (600 mg) by mouth every 6 hours as needed for other (mild pain)   Last time this was given:  600 mg on 5/22/2018  8:02 AM   Next Dose Due:  Available after 2PM today                                   lactobacillus rhamnosus (GG) capsule   Take 1 capsule by mouth 2 times daily   Last time this was given:  1 capsule on 5/22/2018  8:02 AM   Next Dose Due:  Tonight 5/22/18 @ bedtime                                      levofloxacin 500 MG tablet   Commonly known as:  LEVAQUIN   Take 1 tablet (500 mg) by mouth daily for 4 days   Start taking on:  5/23/2018   Next Dose Due:  Due 5/23/18                                   loratadine 10 MG tablet   Commonly known as:  CLARITIN   Take 10 mg by mouth daily   Last time this was given:  10 mg on 5/22/2018  8:02 AM   Next Dose Due:  Due 5/23/18                                   multivitamin, therapeutic with minerals Tabs tablet   Take 1 tablet by mouth daily   Next Dose Due:  Resume home schedule                                   traMADol 50 MG tablet   Commonly known as:  ULTRAM   Take 1 tablet (50 mg) by mouth every 6 hours as needed for moderate pain   Last time this was given:  50 mg on 5/22/2018  3:00 AM   Next Dose Due:  Available anytime                                   VITAMIN D-3 PO   Take 1,000 Units by mouth daily   Next Dose Due:  Resume home schedule.                                             More Information        Discharge Instructions for  Pyelonephritis  You have been told you have a kidney infection. This is called pyelonephritis. The infection can be serious. It can damage your kidneys and cause bacteria to enter your bloodstream. You were treated in the hospital. Once you return home, here s what you can do at home to aid in your recovery and prevent future infections.  Home care    Take all the medicine you were prescribed, even if you feel better. Not finishing the medicine can make the infection come back. It may also make a future infection harder to treat.    Unless told not to by your healthcare provider, drink 8 to 12 glasses of fluid every day. Clear fluids, such as water, are best. This may help flush the infection from your system.  Preventing future infection    Keep your genital area clean. Use mild soap. Rinse with water.    If you are a woman, always wipe the genital area from front to back.    Urinate frequently. Avoid holding urine in the bladder for a long time.    Always urinate after sexual intercourse.  Follow-up care  Follow up with your healthcare provider, or as advised. And see your healthcare provider for regular lab tests as directed.     When to call your healthcare provider  Call your healthcare provider right away if you have any of the following:    Decreased urine output or trouble urinating    Severe pain in the lower back or flank    Fever of 100.4 F (38 C) or higher, or as directed by your healthcare provider    Shaking chills    Vomiting    Blood in your urine    Dark-colored or foul-smelling urine    Nausea or other problems that prevent you from taking your prescribed medicine   Date Last Reviewed: 2/1/2017 2000-2017 [employer]. [address] [place] [address], [place] [address]. All rights reserved. This information is not intended as a substitute for professional medical care. Always follow your healthcare professional's instructions.

## 2018-05-20 NOTE — H&P
[facility]    History and Physical  Hospitalist       Date of Admission:  5/20/2018    Assessment & Plan   Lilibeth Santizo is a 32 year old female who presents with fevers, shaking chills, nausea, right-sided paraspinal back pain found to have a leukocytosis and abnormal urinalysis concerning for pyelonephritis    Possible pyelonephritis versus complicated urinary tract infection: history of E. coli and Klebsiella urinary tract infections. Historically these have been largely sensitive, occasionally Resistant to penicillin/Augmentin per Review of outside records.  -Blood culture ×2 pending  -Urine culture pending  -Levofloxacin 500 every 24 hours given cephalosporin allergy  -Continue probiotics; patient recently with diarrhea following clindamycin administration.  -If urine culture is negative or exam clinically worsens, consider other etiologies for fever and pain.  Consider influenza swab, though this seems less likely as patient with postnasal drip symptoms preceding mild cough which both preceded fevers by multiple days.  -Pain control with acetaminophen, ibuprofen, low-dose oxycodone available.  Discussed and recommended lidocaine cream given superficial nature of right flank pain, which I do not believe to be consistent with pyelonephritis.  -Normal saline at 125/h  -Intake and output  -Diet as tolerated  -Repeat BMP in a.m. imaging of to ensure no worsening creatinine.  If creatinine becomes elevated, low threshold for imaging of  system.    Right flank pain: Patient endorses right flank pain first noted 3 days ago after raking/heavy yard work and initially improved with menthol creams.  Suspect this likely represents muscular injury.  Reproducible, approximately level of L3 at superficial paraspinal musculature.  No midline tenderness.  -Lidocaine cream, Tylenol, ibuprofen as above  -Mobilize    Recent diarrhea: Patient recently hospitalized at outside facility for diarrhea following  clindamycin initiation.  Colonoscopy with enteritis/colitis during that hospitalization.  Diarrhea has now resolved.  It is possible that recent diarrhea/colitis is resulting in pyuria and confounding initial workup for fevers (March hospitalization, so seems less likely).  -Continue lactobacillus supplementation while on anti-infectives.  -Monitor for diarrhea.    Cough: Most consistent with postnasal drip in the setting of patient's known seasonal allergies.  Cough and sinus pressure preceded back pain and later fevers.  Chest x-ray without infiltrate.  -Cetirizine 10 mg daily  -Tessalon Pearls for cough  -If urine culture negative, may need to broaden infectious workup including evaluation of viral etiologies, which certainly could produce myalgias and fevers, rigors if viremia  -Mobilize, encourage pulmonary toilet    # Pain Assessment:  Current Pain Score 5/20/2018   Pain score (0-10) 7   - Lilibeth is experiencing pain due to what appears to be a musculoskeletal/superficial cause on right flank. Pain management was discussed and the plan was created in a collaborative fashion.  Lilibeth's response to the current recommendations: engaged  -Oxycodone for more severe pain.  Continue Tylenol and ibuprofen.  Lidocaine cream for more superficial aspect of right flank pain    DVT Prophylaxis: Pneumatic Compression Devices    Code Status: Full Code    Disposition: Expected discharge in 1-2 days pending urine culture results and clinical improvement.  Patient tolerating oral intake at this time, would ensure appropriate anti-infective treatment prior to discharge, though could otherwise discharge when feeling clinically improved    Randall Gabriel    Primary Care Physician   Primarily follows to the [facility]    Chief Complaint   Fever, shaking chills, right sided back pain    History is obtained from the patient, chart review, review of outside records including recent discharge from [facility]    History of Present Illness   Lilibeth Santizo is a 32 year old female who presents with acute worsening of right-sided back pain and fevers/malaise.    Patient has had several hospitalizations over the course of March including 2 hospitalizations at [facility].  Hospitalized 3/10-3/13 for left ear pain and headache thought to be neuropathic in the setting of historical mastoidectomy.  Hospitalized 3/16-3/28 with ongoing diarrhea without abdominal pain in the setting of antibiotics including Ciprodex drops and clindamycin from prior hospitalization.  Patient actually underwent colonoscopy at that time with biopsies demonstrating focal colitis without evidence of chronicity, which would suggest against inflammatory bowel disease.  Patient describes an interaction with rounding provider at outside hospital where she describes being accused of misusing benzodiazepines, thus patient presented to [facility].  Patient did have a positive urine drug screen at that time with benzodiazepine metabolites, patient attributed this to receiving Valium from prior admission, which was prescribed.    Over the past week or so, patient has had nasal congestion, more recently with a dry cough.  Attributes this to her seasonal allergies.  Has not overall felt unwell with this.    3 days ago, patient was raking and detaching her lawn, subsequently developed right lateral back pain.  Patient attributed this to a muscular injury.  States that she had been using topical menthol cream and this had been palliative.  Pain continued to progress/worsen, and over the course of 5/19/18 evening/night patient developed fevers greater than 101 as well as malaise.  Patient presented to [facility] where she was noted to be tachycardic, febrile.  Patient does note shaking chills last night.  No pain with inspiration, no diarrhea.  Some nausea without emesis.  Does have increased pain in her low  back when she coughs.  No midline back tenderness.  A urinalysis was obtained which was positive for pyuria and trace blood.  History of E. coli and Klebsiella urinary tract infections in 2015, 2017 through outside records.  No prior history of nephrolithiasis.  History of abdominal pain related to endometriosis per patient's report with multiple CT scans through outside system most recent studies November 2017, March 2018 (this most recent study was actually for evaluation of her diarrhea as above).     patient was concerned that she may have developed appendicitis when she began having fevers.      Following discussion with ED provider regarding results, patient has questions and concerns regarding recent diagnosis of urinary tract infection in her son.  Patient has a 5-year-old son who is autistic and was recently diagnosed with a staphylococcal urinary tract infection at [facility], describes son is having bipolar ureters on 1 of his kidneys.  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  I think this is unlikely, explained this to patient.  Son has ureteral reflux.  Patient appears to have had 4 urinary tract infections in the past 4 years by review of outside records.  Most likely, if pyelonephritis/urinary tract infection, this represents urethral entrance of typical penelope resulting in a urinary tract infection.    Right paraspinal back pain at approximately L3 level is superficial, readily reproducible.  No tenderness to percussion at costovertebral angles.  Discussed with the patient that this may indeed represent a muscular injury and to be unrelated to fevers and shaking chills.  Patient's history of menthol cream providing relief would support this.    Past Medical History    I have reviewed this patient's medical history and updated it with pertinent information if needed.   Left-sided  mastoiditis  Intermittent urinary tract infections  Matched chromosomal translocation  Irregular periods  Endometriosis  Irritable bowel syndrome  Obesity    Past Surgical History   I have reviewed this patient's surgical history and updated it with pertinent information if needed.  Past Surgical History:   Procedure Laterality Date     C NONSPECIFIC PROCEDURE      multiple ear surgery.  Left mastoidectomy     C NONSPECIFIC PROCEDURE      ankle      SECTION       LAPAROSCOPIC TUBAL LIGATION         Prior to Admission Medications   Prior to Admission Medications   Prescriptions Last Dose Informant Patient Reported? Taking?   IBUPROFEN 200 200 MG OR TABS   Yes No   Si-2 TABLET EVERY 4 TO 6 HOURS AS NEEDED   ORDER FOR DME   No No   Sig: arm sling      Facility-Administered Medications: None     Allergies   Allergies   Allergen Reactions     Cefixime Hives     Penicillins Hives     Has tolerated augmentin more recently (hives when younger)       Suprax [Cephalosporins]      Suprax- Unknown reaction       Social History   I have reviewed this patient's social history and updated it with pertinent information if needed. Lilibeth Santizo  reports that she has never smoked. She has never used smokeless tobacco. She reports that she drinks alcohol. She reports that she does not use illicit drugs.     Family History   I have reviewed this patient's family history and updated it with pertinent information if needed.   Family History   Problem Relation Age of Onset     Breast Cancer Maternal Grandmother    Mother with depression and clotting history  Son with matched chromosomal translocation and recent Staphylococcus urinary tract infection.  Patient reports that son also with dual ureters on one of his kidneys, ureteral reflux.    Review of Systems   The 10 point Review of Systems is negative other than noted in the HPI or here.   Shaking chills yesterday  Frontal sinus and nasal congestion consistent with  patient's seasonal allergies per her report  Dry cough not productive for sputum  No shortness of breath    Physical Exam   Temp: 98.1  F (36.7  C) Temp src: Oral BP: 118/63   Heart Rate: 104 Resp: 17 SpO2: 97 % O2 Device: None (Room air)    Vital Signs with Ranges  Temp:  [98.1  F (36.7  C)-101.5  F (38.6  C)] 98.1  F (36.7  C)  Heart Rate:  [104-128] 104  Resp:  [17-20] 17  BP: (115-156)/(63-88) 118/63  SpO2:  [95 %-99 %] 97 %  200 lbs 0 oz    Constitutional: no acute distress, alert, conversant.  Occasional grimace with what appears to be a spasm of back pain.  Eyes: no scleral icterus or injection  HEENT: moist mucous membranes  Respiratory: breath sounds clear bilaterally to auscultation, no wheezes, no crackles.  Poor effort as deep inspiration exacerbates cough.  Occasional dry cough  Cardiovascular: Tachycardic to 110 range, no murmur  GI: abdomen soft, non-tender, normoactive bowel sounds, no masses  Lymph/Hematologic: no lower extremity swelling  Genitourinary: No tenderness to percussion of costovertebral angles bilaterally  Skin: no rashes, warm  Musculoskeletal: muscular tone intact in all extremities.  Superficial reproducible tenderness of right sided back at approximately L3 level.  No tenderness to palpation of midline spine.  Neurologic: mental status grossly intact, no focal deficits, alert  Psychiatric: normal affect    Data   Data reviewed today:  I personally reviewed no images or EKG's today.    Recent Labs  Lab 05/20/18  0134   WBC 15.7*   HGB 12.4   MCV 90         POTASSIUM 3.9   CHLORIDE 103   CO2 21   BUN 14   CR 0.86   ANIONGAP 12   SILAS 7.9*   GLC 96   ALBUMIN 3.6   PROTTOTAL 9.0*   BILITOTAL 0.5   ALKPHOS 71   ALT 21   AST 34       Recent Results (from the past 24 hour(s))   XR Chest 2 Views    Narrative    XR CHEST 2 VW 5/20/2018 2:01 AM    HISTORY: Fever.    COMPARISON: None.      Impression    IMPRESSION: The lungs are clear. No focal pulmonary opacities. Heart  and  mediastinum are unremarkable. No acute cardiopulmonary  abnormalities.    GAY ROMERO MD

## 2018-05-20 NOTE — PLAN OF CARE
Problem: Patient Care Overview  Goal: Plan of Care/Patient Progress Review  A&oX4, bp was soft this morning bolus given of 1000ml other VSS on RA. Aferible this shift. Pain rated at /10 prn oxycodonex2 and tylenolx2 given. Gave prn tessalon for infrequent nonproductive cough. Heat applied to back. Regular diet.  Complaints of nausea prn zofran given-effective Up ind/SBA. CT scan ordered for this afternoon has been NPO since 140pm.

## 2018-05-20 NOTE — PROVIDER NOTIFICATION
MD Notification    Notified Person: MD    Notified Person Name: Dr. Gabriel    Notification Date/Time: 05/20  1440      Notification Interaction: Paged on call    Purpose of Notification: Pts pain increased from 6 to 8/10 after giving PRN oxycodone. Now having pain on L side of back.     Orders Received: Try PRN tylenol and lidocaine. Orders for one time additional dose of oxycodone for pain.     Comments:

## 2018-05-20 NOTE — IP AVS SNAPSHOT
[address]    [facility] [address]    Phone:  [phone]                                       After Visit Summary   5/20/2018    Lilibeth Santizo    MRN: [id number]           After Visit Summary Signature Page     I have received my discharge instructions, and my questions have been answered. I have discussed any challenges I see with this plan with the nurse or doctor.    ..........................................................................................................................................  Patient/Patient Representative Signature      ..........................................................................................................................................  Patient Representative Print Name and Relationship to Patient    ..................................................               ................................................  Date                                            Time    ..........................................................................................................................................  Reviewed by Signature/Title    ...................................................              ..............................................  Date                                                            Time

## 2018-05-20 NOTE — ED NOTES
I called lab for blood cx lab collect so they could meet her on 8th floor.  Lab stated they tried drawing her cultures in ED room, but Dr. Gabriel was assessing patient and sent lab away.  Lab, Kayley, confirmed they will now meet patient on 8th floor to complete order

## 2018-05-20 NOTE — PLAN OF CARE
Problem: Patient Care Overview  Goal: Plan of Care/Patient Progress Review  Outcome: No Change  Pt is A&Ox4. Anxious at times. VSS, afebrile since coming to unit from ED. Pain rated 6/10 in R lower back, increased to 8 and now L lower back after giving oxycodone. Paged MD to notify about change in pain location and not relieved by pain medication. Gave tylenol and lidocaine cream, and one time additional dose of oxycodone. Heat applied. Somewhat effective. LS clear, infrequent nonproductive cough, which increases back pain, does have PRN medication available for cough. Regular diet. C/o nausea from pain, Zofran given in ED, PRN compazine given, effective. Up independently/SBA. R PIV infusing NS @125. Voiding adequately. UC and BC pending. Plans for discharge pending cultures and clinical improvement. Will continue to monitor

## 2018-05-20 NOTE — PHARMACY-ADMISSION MEDICATION HISTORY
Admission medication history interview status for the 5/20/2018  admission is complete. See EPIC admission navigator for prior to admission medications     Medication history source reliability:Moderate    Actions taken by pharmacist (provider contacted, etc):None     Additional medication history information not noted on PTA med list : Patient compliance is questionable, she said she forgets her medications often.    Medication reconciliation/reorder completed by provider prior to medication history? Yes    Time spent in this activity: 15 minutes    Prior to Admission medications    Medication Sig Last Dose Taking? Auth Provider   Cholecalciferol (VITAMIN D-3 PO) Take 1,000 Units by mouth daily 5/19/2018 at AM Yes Unknown, Entered By History   Cranberry 500 MG CAPS Take 1 capsule by mouth daily 5/19/2018 at AM Yes Unknown, Entered By History   guaiFENesin (MUCINEX) 600 MG 12 hr tablet Take 600 mg by mouth 2 times daily 5/19/2018 at PM Yes Unknown, Entered By History   loratadine (CLARITIN) 10 MG tablet Take 10 mg by mouth daily 5/19/2018 at AM Yes Unknown, Entered By History   multivitamin, therapeutic with minerals (THERA-VIT-M) TABS tablet Take 1 tablet by mouth daily 5/19/2018 at PM Yes Unknown, Entered By History   polyethylene glycol 400 (BLINK TEARS) 0.25 % SOLN ophthalmic solution Place 2 drops into both eyes 2 times daily as needed for dry eyes 5/20/2018 at AM Yes Unknown, Entered By History

## 2018-05-21 LAB
ANION GAP SERPL CALCULATED.3IONS-SCNC: 7 MMOL/L (ref 3–14)
BACTERIA SPEC CULT: ABNORMAL
BUN SERPL-MCNC: 6 MG/DL (ref 7–30)
CALCIUM SERPL-MCNC: 7 MG/DL (ref 8.5–10.1)
CHLORIDE SERPL-SCNC: 109 MMOL/L (ref 94–109)
CO2 SERPL-SCNC: 23 MMOL/L (ref 20–32)
CREAT SERPL-MCNC: 0.8 MG/DL (ref 0.52–1.04)
ERYTHROCYTE [DISTWIDTH] IN BLOOD BY AUTOMATED COUNT: 13.2 % (ref 10–15)
GFR SERPL CREATININE-BSD FRML MDRD: 82 ML/MIN/1.7M2
GLUCOSE SERPL-MCNC: 88 MG/DL (ref 70–99)
HCT VFR BLD AUTO: 31 % (ref 35–47)
HGB BLD-MCNC: 10.4 G/DL (ref 11.7–15.7)
Lab: ABNORMAL
MCH RBC QN AUTO: 30.5 PG (ref 26.5–33)
MCHC RBC AUTO-ENTMCNC: 33.5 G/DL (ref 31.5–36.5)
MCV RBC AUTO: 91 FL (ref 78–100)
PLATELET # BLD AUTO: 173 10E9/L (ref 150–450)
POTASSIUM SERPL-SCNC: 3.8 MMOL/L (ref 3.4–5.3)
RBC # BLD AUTO: 3.41 10E12/L (ref 3.8–5.2)
SODIUM SERPL-SCNC: 139 MMOL/L (ref 133–144)
SPECIMEN SOURCE: ABNORMAL
WBC # BLD AUTO: 10.4 10E9/L (ref 4–11)

## 2018-05-21 PROCEDURE — 36415 COLL VENOUS BLD VENIPUNCTURE: CPT | Performed by: INTERNAL MEDICINE

## 2018-05-21 PROCEDURE — 25000125 ZZHC RX 250: Performed by: INTERNAL MEDICINE

## 2018-05-21 PROCEDURE — 25000132 ZZH RX MED GY IP 250 OP 250 PS 637: Performed by: INTERNAL MEDICINE

## 2018-05-21 PROCEDURE — 12000000 ZZH R&B MED SURG/OB

## 2018-05-21 PROCEDURE — 80048 BASIC METABOLIC PNL TOTAL CA: CPT | Performed by: INTERNAL MEDICINE

## 2018-05-21 PROCEDURE — 25000128 H RX IP 250 OP 636: Performed by: INTERNAL MEDICINE

## 2018-05-21 PROCEDURE — 99233 SBSQ HOSP IP/OBS HIGH 50: CPT | Performed by: INTERNAL MEDICINE

## 2018-05-21 PROCEDURE — 85027 COMPLETE CBC AUTOMATED: CPT | Performed by: INTERNAL MEDICINE

## 2018-05-21 RX ADMIN — LEVOFLOXACIN 500 MG: 5 INJECTION, SOLUTION INTRAVENOUS at 03:55

## 2018-05-21 RX ADMIN — BENZONATATE 100 MG: 100 CAPSULE ORAL at 00:49

## 2018-05-21 RX ADMIN — OXYCODONE HYDROCHLORIDE 5 MG: 5 TABLET ORAL at 00:45

## 2018-05-21 RX ADMIN — IBUPROFEN 600 MG: 600 TABLET ORAL at 22:23

## 2018-05-21 RX ADMIN — SENNOSIDES AND DOCUSATE SODIUM 2 TABLET: 8.6; 5 TABLET ORAL at 20:47

## 2018-05-21 RX ADMIN — IBUPROFEN 600 MG: 600 TABLET ORAL at 00:49

## 2018-05-21 RX ADMIN — SODIUM CHLORIDE: 9 INJECTION, SOLUTION INTRAVENOUS at 00:54

## 2018-05-21 RX ADMIN — OXYCODONE HYDROCHLORIDE 5 MG: 5 TABLET ORAL at 07:21

## 2018-05-21 RX ADMIN — Medication 1 CAPSULE: at 20:47

## 2018-05-21 RX ADMIN — SODIUM CHLORIDE: 9 INJECTION, SOLUTION INTRAVENOUS at 09:36

## 2018-05-21 RX ADMIN — SENNOSIDES AND DOCUSATE SODIUM 1 TABLET: 8.6; 5 TABLET ORAL at 01:01

## 2018-05-21 RX ADMIN — IBUPROFEN 600 MG: 600 TABLET ORAL at 09:06

## 2018-05-21 RX ADMIN — ONDANSETRON 4 MG: 2 INJECTION INTRAMUSCULAR; INTRAVENOUS at 07:51

## 2018-05-21 RX ADMIN — SENNOSIDES AND DOCUSATE SODIUM 2 TABLET: 8.6; 5 TABLET ORAL at 08:01

## 2018-05-21 RX ADMIN — LORATADINE 10 MG: 10 TABLET ORAL at 08:01

## 2018-05-21 RX ADMIN — IBUPROFEN 600 MG: 600 TABLET ORAL at 14:53

## 2018-05-21 RX ADMIN — SODIUM CHLORIDE: 9 INJECTION, SOLUTION INTRAVENOUS at 22:18

## 2018-05-21 RX ADMIN — LIDOCAINE: 40 CREAM TOPICAL at 01:04

## 2018-05-21 RX ADMIN — LIDOCAINE: 40 CREAM TOPICAL at 13:03

## 2018-05-21 RX ADMIN — Medication 25 MG: at 09:53

## 2018-05-21 RX ADMIN — BENZONATATE 100 MG: 100 CAPSULE ORAL at 22:23

## 2018-05-21 RX ADMIN — ONDANSETRON 4 MG: 4 TABLET, ORALLY DISINTEGRATING ORAL at 18:44

## 2018-05-21 RX ADMIN — GUAIFENESIN 600 MG: 600 TABLET, EXTENDED RELEASE ORAL at 20:47

## 2018-05-21 RX ADMIN — ACETAMINOPHEN 650 MG: 325 TABLET ORAL at 08:02

## 2018-05-21 RX ADMIN — ACETAMINOPHEN 650 MG: 325 TABLET ORAL at 22:23

## 2018-05-21 RX ADMIN — OXYCODONE HYDROCHLORIDE 5 MG: 5 TABLET ORAL at 03:54

## 2018-05-21 RX ADMIN — ACETAMINOPHEN 650 MG: 325 TABLET ORAL at 04:02

## 2018-05-21 RX ADMIN — GUAIFENESIN 600 MG: 600 TABLET, EXTENDED RELEASE ORAL at 08:01

## 2018-05-21 RX ADMIN — BENZONATATE 100 MG: 100 CAPSULE ORAL at 08:01

## 2018-05-21 RX ADMIN — Medication 50 MG: at 17:36

## 2018-05-21 RX ADMIN — POLYETHYLENE GLYCOL 3350 17 G: 17 POWDER, FOR SOLUTION ORAL at 20:47

## 2018-05-21 RX ADMIN — ACETAMINOPHEN 650 MG: 325 TABLET ORAL at 11:54

## 2018-05-21 RX ADMIN — PROCHLORPERAZINE EDISYLATE 10 MG: 5 INJECTION INTRAMUSCULAR; INTRAVENOUS at 13:00

## 2018-05-21 RX ADMIN — Medication 1 CAPSULE: at 08:01

## 2018-05-21 RX ADMIN — ACETAMINOPHEN 650 MG: 325 TABLET ORAL at 17:36

## 2018-05-21 ASSESSMENT — PAIN DESCRIPTION - DESCRIPTORS
DESCRIPTORS: ACHING;SORE;SHARP
DESCRIPTORS: SHARP

## 2018-05-21 NOTE — PROGRESS NOTES
[facility]    Hospitalist Progress Note      Assessment & Plan   Lilibeth Santizo is a 32 year old female who presents with fevers, shaking chills, nausea, right-sided paraspinal back pain found to have a leukocytosis and abnormal urinalysis concerning for pyelonephritis     Right E. coli pyelonephritis with sepsis: history of E. coli and Klebsiella urinary tract infections. Historically these have been largely sensitive, occasionally Resistant to penicillin/Augmentin per Review of outside records. CT on admission shows evidence of right perinephric fat stranding.    -Blood cultures negative to date - monitor  -monitor for final urine culture  -Levofloxacin 500 every 24 hours given cephalosporin allergy  -Continue probiotics; patient recently with diarrhea following clindamycin administration.  -Normal saline at 125/h     Right flank pain: Patient endorses right flank pain first noted 3 days ago after raking/heavy yard work and initially improved with menthol creams.  Suspect this likely represents muscular injury with a component due to the pyelonephritis.  Reproducible, approximately level of L3 at superficial paraspinal musculature.  No midline tenderness.  -Lidocaine cream, Tylenol, ibuprofen as above  -trying to minimize narcotics, trial of tramadol today, discussed with patient today that I would like to try and get her off narcotics before discharge.     Recent diarrhea: Patient recently hospitalized at outside facility for diarrhea following clindamycin initiation.  Colonoscopy with enteritis/colitis during that hospitalization.  Diarrhea has now resolved.   -Continue lactobacillus supplementation while on anti-infectives.  -Monitor for diarrhea.     Cough: Most consistent with postnasal drip in the setting of patient's known seasonal allergies.  Cough and sinus pressure preceded back pain and later fevers.  Chest x-ray without infiltrate.  -Cetirizine 10 mg daily  -Tessalon Pearls for  cough  -Mobilize, encourage pulmonary toilet    # Pain Assessment:  Current Pain Score 5/21/2018   Patient currently in pain? -   Pain score (0-10) 9   Pain location -   Pain descriptors -   - Lilibeth is experiencing pain due to right flank muscle strain/pyelo. Pain management was discussed and the plan was created in a collaborative fashion.  Lilibeth's response to the current recommendations: engaged  - Please see the plan for pain management as documented above        DVT Prophylaxis: Pneumatic Compression Devices  Code Status: Full Code    Disposition: Expected discharge tomorrow days once urine culture results final, no further fever and normal BP today.    Shawn Sauceda    Interval History   Still having some discomfort on the right flank but a bit better with current pain management.  She is still coughing which some post nasal drip which has been going on for about 2-3 weeks per report.  Some tightness noted with coughing.  No diarrhea but does have some constipation.      -Data reviewed today: I reviewed all new labs and imaging results over the last 24 hours. I personally reviewed no images or EKG's today.    Physical Exam   Temp: 96.9  F (36.1  C) Temp src: Oral BP: 101/62   Heart Rate: 85 Resp: 20 SpO2: 98 % O2 Device: None (Room air)    Vitals:    05/20/18 0057 05/20/18 0333   Weight: 90.7 kg (200 lb) 91.9 kg (202 lb 9.6 oz)     Vital Signs with Ranges  Temp:  [96.9  F (36.1  C)-103.3  F (39.6  C)] 96.9  F (36.1  C)  Heart Rate:  [82-97] 85  Resp:  [15-20] 20  BP: (101-126)/(61-70) 101/62  SpO2:  [95 %-100 %] 98 %  I/O last 3 completed shifts:  In: 3423 [I.V.:3423]  Out: 2350 [Urine:2350]    Constitutional: awake, alert, cooperative    Respiratory: Clear to auscultation bilaterally, no crackles or wheezing noted.  Good air exchange.     Cardiovascular: Regular rate and rhythm.  No murmur, rub or gallop noted.     GI: Positive bowel sounds, soft, non-distended, non-tender.  No masses or organomegaly  noted.     Musculoskeletal: some right lower back/flank pain, tender to palpation    Neurologic: Awake, alert and oriented to name, place and time.  Cranial nerves II-XII are grossly intact.      Lymphatic: No edema noted    Skin: no rash    Medications     sodium chloride 125 mL/hr at 05/21/18 0054       guaiFENesin  600 mg Oral BID     lactobacillus rhamnosus (GG)  1 capsule Oral BID     levofloxacin  500 mg Intravenous Q24H     loratadine  10 mg Oral Daily     sodium chloride (PF)  3 mL Intracatheter Q8H       Data     Recent Labs  Lab 05/21/18  0725 05/20/18  0707 05/20/18  0134   WBC 10.4  --  15.7*   HGB 10.4*  --  12.4   MCV 91  --  90    194 252     --  136   POTASSIUM 3.8  --  3.9   CHLORIDE 109  --  103   CO2 23  --  21   BUN 6*  --  14   CR 0.80 0.87 0.86   ANIONGAP 7  --  12   SILAS 7.0*  --  7.9*   GLC 88  --  96   ALBUMIN  --   --  3.6   PROTTOTAL  --   --  9.0*   BILITOTAL  --   --  0.5   ALKPHOS  --   --  71   ALT  --   --  21   AST  --   --  34       Recent Results (from the past 24 hour(s))   CT Abdomen Pelvis Hematuria w/o & w Contrast    Narrative    CT ABDOMEN AND PELVIS WITHOUT AND WITH CONTRAST  5/20/2018 3:27 PM      HISTORY: History of right flank pain, fever. Rule out pyelonephritis  and renal calculi.     COMPARISON: February 22, 2009    TECHNIQUE: Volumetric helical acquisition of CT images from the lung  bases through the symphysis pubis before and after the uneventful  administration of 100 mL Isovue-370 intravenous contrast. Radiation  dose for this scan was reduced using automated exposure control,  adjustment of the mA and/or kV according to patient size, or iterative  reconstruction technique.    FINDINGS: There are no stones seen within either kidney, either  ureter, or the bladder. There is no hydroureter or hydronephrosis.  There is no perinephric fat stranding. Kidneys are normal in size and  configuration. No suspicious filling defects seen in the  opacified  intrarenal collecting systems, ureters, or bladder to suggest a  urothelial malignancy. There are single ureters and intrarenal  collecting systems bilaterally. There is perinephric stranding around  the right kidney which may indicate pyelonephritis. Cortical scarring  is noted compatible with prior infectious change. The liver, spleen,  adrenal glands, and pancreas demonstrate no worrisome focal lesion.   Probable cyst in the spleen superiorly. Normal caliber aorta. No  diverticulitis. Unremarkable gallbladder. Appendix not well seen.   Small amount of free fluid. No free air in the abdomen. There are no  abdominal or pelvic lymph nodes that are abnormal by size criteria.   There are no dilated loops of small intestine or large bowel to  suggest ileus or obstruction.The visualized lung bases demonstrate  mild bibasilar atelectasis and/or infiltrate. Bone windows reveal no  destructive lesions.      Impression    IMPRESSION:   1. No evidence for renal, ureteral, or bladder calculi.  2. No masses or suspicious filling defects within the opacified  urinary collecting system.   3. Perinephric stranding on the right compatible with pyelonephritis.  Differential would include a recently passed stone as well on the  right.  4. Areas of cortical scarring in the right kidney compatible with  prior infectious change.  5. Mild bibasilar atelectasis and/or infiltrate.

## 2018-05-21 NOTE — PROGRESS NOTES
MD Notification    Person notified: Dr. Wong    Person Name: Danika Carey RN    Date/Time: 5/20/18@6140    Interaction: Phone    Purpose of Notification: Lactic acid 2.9.    Orders Received: No new orders.

## 2018-05-21 NOTE — PROGRESS NOTES
[facility]    Sepsis Evaluation Progress Note    Date of Service: 05/20/2018    I was called to see Lilibeth Santizo due to abnormal vital signs triggering the Sepsis SIRS screening alert. She is known to have an infection. And is already being appropriately treated for sepsis.    Physical Exam    Vital Signs:  Temp: (P) 102.4  F (39.1  C) ( patient on abx, MD aware CT results, see her note) Temp src: Oral BP: 124/69   Heart Rate: 92 Resp: 16 SpO2: 98 % O2 Device: None (Room air)      Lab:  Lactic Acid   Date Value Ref Range Status   05/20/2018 1.1 0.7 - 2.0 mmol/L Final       The patient is at baseline mental status.        Assessment and Plan    The SIRS and exam findings are likely due to   sepsis.     ID: The patient is currently on the following antibiotics:  Anti-infectives (Future)    Start     Dose/Rate Route Frequency Ordered Stop    05/21/18 0300  levofloxacin (LEVAQUIN) infusion 500 mg      500 mg  100 mL/hr over 60 Minutes Intravenous EVERY 24 HOURS 05/20/18 0333          Current antibiotic coverage is appropriate for source of infection.    Fluid: Fluid bolus not indicated due to normotensive, already on continuous infusion.    Lab: Repeat lactic acid ordered for 2 hours from now.    Disposition: The patient will remain on the current unit. We will continue to monitor this patient closely.    Haylee Wong MD

## 2018-05-21 NOTE — PLAN OF CARE
Problem: Patient Care Overview  Goal: Plan of Care/Patient Progress Review  Outcome: No Change  Patient is A&Ox4. Anxious at times. Given bolus of 1000mL fluids on day shift due to soft blood pressure. VSS ex T max:103.3. Current temp 101.9. Pain rated at 10 out of 10 (headache) and low back pain rated at 7. Gave PRN oxycodone x 1 and tylenol x 1- somewhat effective. Lung sounds clear. Regular diet. Voiding adequately. R PIV infusing NS @125mL/hr. Up with SBA (calls appropriately). Lactic acid 2.9 (MD aware and no new orders). Plan: Possible discharge in 1-2 days.

## 2018-05-21 NOTE — PLAN OF CARE
Problem: Patient Care Overview  Goal: Plan of Care/Patient Progress Review  Elevated temperature, lactic acid elevated, MD aware.  BP stable, on room air. Tylenol given. Complains of left flank pain, oxycodone for pain. Up with SBA. Voids in bathroom. Denies SOB/chest pain. Denied nausea but requested compazine to prevent nausea.  LS clear. Tessalon given for un frequent non productive cough. BS active. Last BM 5/19. Patient requested  miralax and senna.

## 2018-05-21 NOTE — PLAN OF CARE
Problem: Patient Care Overview  Goal: Plan of Care/Patient Progress Review  A&Ox4. Anxious. VSS on RA TMAX 96.9. Up ind in room, voiding adequately in BR. Regular diet tolerating well. Pain rated 8 out of 10 gave prn tramadol, tylenol and ibuprofen. Lidocaine cream applied this shift to lower back. Heat packs applied. Tessalon given for cough. Lung sounds clear, infrequent cough. RPIV infusing NS @ 125 ml/hr.

## 2018-05-21 NOTE — PLAN OF CARE
Problem: Patient Care Overview  Goal: Plan of Care/Patient Progress Review  Outcome: No Change  Pt is A&Ox4. Anxious. VSS ex tmax of 99.4. Up SBA in room, voiding adequately in BR. Regular diet. Pain rated at 10 out of 10 (headache) and low back pain rated at 7. Gave prn oxycodone and ibuprofen given. Lidocaine cream applied. Heat packs and aromatherapy also helping. Tessalon given for cough. Lung sounds clear, infrequent cough. R PIV infusing NS @125mL/hr. Plans for discharge pending. Will continue to monitor.

## 2018-05-22 VITALS
BODY MASS INDEX: 33.76 KG/M2 | DIASTOLIC BLOOD PRESSURE: 62 MMHG | WEIGHT: 202.6 LBS | RESPIRATION RATE: 16 BRPM | TEMPERATURE: 96.4 F | HEIGHT: 65 IN | SYSTOLIC BLOOD PRESSURE: 116 MMHG | OXYGEN SATURATION: 100 %

## 2018-05-22 PROCEDURE — 25000132 ZZH RX MED GY IP 250 OP 250 PS 637: Performed by: INTERNAL MEDICINE

## 2018-05-22 PROCEDURE — 25000128 H RX IP 250 OP 636: Performed by: INTERNAL MEDICINE

## 2018-05-22 PROCEDURE — 99239 HOSP IP/OBS DSCHRG MGMT >30: CPT | Performed by: INTERNAL MEDICINE

## 2018-05-22 RX ORDER — LACTOBACILLUS RHAMNOSUS GG 10B CELL
1 CAPSULE ORAL 2 TIMES DAILY
Qty: 30 CAPSULE | Refills: 0 | Status: SHIPPED | OUTPATIENT
Start: 2018-05-22

## 2018-05-22 RX ORDER — IBUPROFEN 600 MG/1
600 TABLET, FILM COATED ORAL EVERY 6 HOURS PRN
Qty: 60 TABLET | Refills: 0 | Status: SHIPPED | OUTPATIENT
Start: 2018-05-22

## 2018-05-22 RX ORDER — TRAMADOL HYDROCHLORIDE 50 MG/1
50 TABLET ORAL EVERY 6 HOURS PRN
Qty: 10 TABLET | Refills: 0 | Status: SHIPPED | OUTPATIENT
Start: 2018-05-22

## 2018-05-22 RX ORDER — LEVOFLOXACIN 500 MG/1
500 TABLET, FILM COATED ORAL DAILY
Qty: 4 TABLET | Refills: 0 | Status: SHIPPED | OUTPATIENT
Start: 2018-05-23 | End: 2018-05-27

## 2018-05-22 RX ADMIN — IBUPROFEN 600 MG: 600 TABLET ORAL at 08:02

## 2018-05-22 RX ADMIN — ACETAMINOPHEN 650 MG: 325 TABLET ORAL at 05:06

## 2018-05-22 RX ADMIN — Medication 50 MG: at 09:47

## 2018-05-22 RX ADMIN — LIDOCAINE: 40 CREAM TOPICAL at 07:32

## 2018-05-22 RX ADMIN — ACETAMINOPHEN 650 MG: 325 TABLET ORAL at 09:47

## 2018-05-22 RX ADMIN — Medication 1 CAPSULE: at 08:02

## 2018-05-22 RX ADMIN — BENZONATATE 100 MG: 100 CAPSULE ORAL at 08:02

## 2018-05-22 RX ADMIN — Medication 50 MG: at 03:00

## 2018-05-22 RX ADMIN — ONDANSETRON 4 MG: 2 INJECTION INTRAMUSCULAR; INTRAVENOUS at 05:06

## 2018-05-22 RX ADMIN — LORATADINE 10 MG: 10 TABLET ORAL at 08:02

## 2018-05-22 RX ADMIN — BISACODYL 10 MG: 10 SUPPOSITORY RECTAL at 08:02

## 2018-05-22 RX ADMIN — GUAIFENESIN 600 MG: 600 TABLET, EXTENDED RELEASE ORAL at 08:02

## 2018-05-22 RX ADMIN — LEVOFLOXACIN 500 MG: 5 INJECTION, SOLUTION INTRAVENOUS at 03:00

## 2018-05-22 RX ADMIN — SENNOSIDES AND DOCUSATE SODIUM 2 TABLET: 8.6; 5 TABLET ORAL at 05:06

## 2018-05-22 ASSESSMENT — PAIN DESCRIPTION - DESCRIPTORS: DESCRIPTORS: ACHING

## 2018-05-22 NOTE — PLAN OF CARE
Problem: Patient Care Overview  Goal: Plan of Care/Patient Progress Review  Outcome: No Change  AxOx4, VS. C/o headache and pain, gave prn tramadol x 1 and tylenol x1; also using heat packs and armoatherapy. Has non productive cough, LS clear. Up ind in room, voiding adequately. C/o nausea and constipation, gave prn IV zofran and po senna, respectively. Possible d/c today if remains afebrile and BP normal.

## 2018-05-22 NOTE — PLAN OF CARE
Problem: Patient Care Overview  Goal: Plan of Care/Patient Progress Review  Outcome: No Change   Pt is AxOx4, VSS, IV infusing, Moderate pain with prn tramadol & tylenol given. Heat packs and aromatherapy also helping. LS clear, Infrequent nonproductive cough- makes pain worse, has PRN medication for cough. Regular diet. Voiding adequately. zofran given x1. Senna and mirlax given.  Up ind.  Plan for dc is pending:  UC and clinical improvement, possible tomorrow and resolution of fevers and normal BP.  Nursing will continue to monitor.

## 2018-05-22 NOTE — PROGRESS NOTES
Discharge paperwork and meds gone over with the patient. Belongings, medication and paperwork sent with patient via [employer]. IV was removed.

## 2018-05-22 NOTE — DISCHARGE SUMMARY
[facility]    Discharge Summary  Hospitalist    Date of Admission:  5/20/2018  Date of Discharge:  5/22/2018  Discharging Provider: Shawn Sauceda  Date of Service (when I saw the patient): 05/22/18      History of Present Illness  From admission H&P  Lilibeth Santizo is a 32 year old female who presents with acute worsening of right-sided back pain and fevers/malaise.     Patient has had several hospitalizations over the course of March including 2 hospitalizations at [facility].  Hospitalized 3/10-3/13 for left ear pain and headache thought to be neuropathic in the setting of historical mastoidectomy.  Hospitalized 3/16-3/28 with ongoing diarrhea without abdominal pain in the setting of antibiotics including Ciprodex drops and clindamycin from prior hospitalization.  Patient actually underwent colonoscopy at that time with biopsies demonstrating focal colitis without evidence of chronicity, which would suggest against inflammatory bowel disease.  Patient describes an interaction with rounding provider at outside hospital where she describes being accused of misusing benzodiazepines, thus patient presented to [facility].  Patient did have a positive urine drug screen at that time with benzodiazepine metabolites, patient attributed this to receiving Valium from prior admission, which was prescribed.     Over the past week or so, patient has had nasal congestion, more recently with a dry cough.  Attributes this to her seasonal allergies.  Has not overall felt unwell with this.     3 days ago, patient was raking and detaching her lawn, subsequently developed right lateral back pain.  Patient attributed this to a muscular injury.  States that she had been using topical menthol cream and this had been palliative.  Pain continued to progress/worsen, and over the course of 5/19/18 evening/night patient developed fevers greater than 101 as well as malaise.  Patient presented to [facility] where she was noted to be tachycardic, febrile.  Patient does note shaking chills last night.  No pain with inspiration, no diarrhea.  Some nausea without emesis.  Does have increased pain in her low back when she coughs.  No midline back tenderness.  A urinalysis was obtained which was positive for pyuria and trace blood.  History of E. coli and Klebsiella urinary tract infections in 2015, 2017 through outside records.  No prior history of nephrolithiasis.  History of abdominal pain related to endometriosis per patient's report with multiple CT scans through outside system most recent studies November 2017, March 2018 (this most recent study was actually for evaluation of her diarrhea as above).      patient was concerned that she may have developed appendicitis when she began having fevers.       Following discussion with ED provider regarding results, patient has questions and concerns regarding recent diagnosis of urinary tract infection in her son.  Patient has a 5-year-old son who is autistic and was recently diagnosed with a staphylococcal urinary tract infection at [facility], describes son is having bipolar ureters on 1 of his kidneys.  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  I think this is unlikely, explained this to patient.  Son has ureteral reflux.  Patient appears to have had 4 urinary tract infections in the past 4 years by review of outside records.  Most likely, if pyelonephritis/urinary tract infection, this represents urethral entrance of typical penelope resulting in a urinary tract infection.     Right paraspinal back pain at approximately L3 level is superficial, readily reproducible.  No tenderness to percussion at costovertebral angles.  Discussed with the patient that this may indeed represent a muscular injury and to be unrelated to fevers and shaking chills.  Patient's  history of menthol cream providing relief would support this.    Hospital Course   Lilibeth Santizo was admitted on 5/20/2018.  The following problems were addressed during her hospitalization:    Right E. coli pyelonephritis with sepsis: history of E. coli and Klebsiella urinary tract infections. Historically these have been largely sensitive, occasionally Resistant to penicillin/Augmentin per Review of outside records. CT on admission shows evidence of right perinephric fat stranding.    -Blood cultures negative to date  -Levofloxacin on discharge for a total of 7 days of therapy  -Continue probiotics; patient recently with diarrhea following clindamycin administration.      Right flank pain: Patient endorses right flank pain first noted 3 days ago after raking/heavy yard work and initially improved with menthol creams.  Suspect this likely represents muscular injury with a component due to the pyelonephritis.  Reproducible, approximately level of L3 at superficial paraspinal musculature.  No midline tenderness.  -Lidocaine cream, Tylenol, ibuprofen and tramadol available  -follow up with PCP as needed      Recent diarrhea: Patient recently hospitalized at outside facility for diarrhea following clindamycin initiation.  Colonoscopy with enteritis/colitis during that hospitalization.  Diarrhea has now resolved.   -Continue lactobacillus supplementation while on anti-infectives.      Cough: Most consistent with postnasal drip in the setting of patient's known seasonal allergies.  Cough and sinus pressure preceded back pain and later fevers.  Chest x-ray without infiltrate.  -can continue with claritin at discharge    # Discharge Pain Plan:   - During her hospitalization, Lilibeth experienced pain due to low back strain.  The pain plan for discharge was discussed with Lilibeth and the plan was created in a collaborative fashion.    - Opioids prescribed on discharge: tramadol  - Duration of opioids after discharge: Per  [facility] opioid prescribing guidelines, a 3 day prescription of opioids was provided.  - Bowel regimen: pt has senna and miralax to take at home    Shawn Sauceda        Pending Results   Unresulted Labs Ordered in the Past 30 Days of this Admission     Date and Time Order Name Status Description    5/20/2018 0317 Blood culture Preliminary     5/20/2018 0317 Blood culture Preliminary           Code Status   Full Code       Primary Care Physician   Physician No Ref-Primary    General: pt resting in bed  Cardiovascular: RRR, no m/r/g  Pulmonary: CTAB  GI: + BS  Lymphatics: no edema  Skin: no rash    Discharge Disposition   Discharged to home  Condition at discharge: Stable    Consultations This Hospital Stay   None    Time Spent on this Encounter   I, Shawn Sauceda, personally saw the patient today and spent greater than 30 minutes discharging this patient.    Discharge Orders     Reason for your hospital stay   You were admitted with pyelonephritis and will complete a total of 7 days of antibiotics for this.     Follow-up and recommended labs and tests    Follow up with PCP in 7 days     Activity   Your activity upon discharge: activity as tolerated     Full Code     Diet   Follow this diet upon discharge: Orders Placed This Encounter     Combination Diet Regular Diet Adult       Discharge Medications   Current Discharge Medication List      START taking these medications    Details   ibuprofen (ADVIL/MOTRIN) 600 MG tablet Take 1 tablet (600 mg) by mouth every 6 hours as needed for other (mild pain)  Qty: 60 tablet, Refills: 0    Comments: Medication refills given.  Associated Diagnoses: Acute right-sided low back pain without sciatica      lactobacillus rhamnosus, GG, (CULTURELL) capsule Take 1 capsule by mouth 2 times daily  Qty: 30 capsule, Refills: 0    Comments: Future refills by PCP  Physician No Ref-Primary with phone number None.  Associated Diagnoses: Acute pyelonephritis      levofloxacin  (LEVAQUIN) 500 MG tablet Take 1 tablet (500 mg) by mouth daily for 4 days  Qty: 4 tablet, Refills: 0    Comments: Medication refills given.  Associated Diagnoses: Acute pyelonephritis      traMADol (ULTRAM) 50 MG tablet Take 1 tablet (50 mg) by mouth every 6 hours as needed for moderate pain  Qty: 10 tablet, Refills: 0    Comments: Future refills by PCP  Physician No Ref-Primary with phone number None.  Associated Diagnoses: Acute pyelonephritis         CONTINUE these medications which have NOT CHANGED    Details   Cholecalciferol (VITAMIN D-3 PO) Take 1,000 Units by mouth daily      Cranberry 500 MG CAPS Take 1 capsule by mouth daily      guaiFENesin (MUCINEX) 600 MG 12 hr tablet Take 600 mg by mouth 2 times daily      loratadine (CLARITIN) 10 MG tablet Take 10 mg by mouth daily      multivitamin, therapeutic with minerals (THERA-VIT-M) TABS tablet Take 1 tablet by mouth daily      polyethylene glycol 400 (BLINK TEARS) 0.25 % SOLN ophthalmic solution Place 2 drops into both eyes 2 times daily as needed for dry eyes           Allergies   Allergies   Allergen Reactions     Cefixime Hives     Penicillins Hives     Has tolerated augmentin more recently (hives when younger)       Suprax [Cephalosporins]      Suprax- Unknown reaction     Data   Most Recent 3 CBC's:  Recent Labs   Lab Test  05/21/18   0725 05/20/18   0707  05/20/18   0134   WBC  10.4   --   15.7*   HGB  10.4*   --   12.4   MCV  91   --   90   PLT  173  194  252      Most Recent 3 BMP's:  Recent Labs   Lab Test  05/21/18   0725  05/20/18   0707  05/20/18   0134   NA  139   --   136   POTASSIUM  3.8   --   3.9   CHLORIDE  109   --   103   CO2  23   --   21   BUN  6*   --   14   CR  0.80  0.87  0.86   ANIONGAP  7   --   12   SILAS  7.0*   --   7.9*   GLC  88   --   96     Most Recent 2 LFT's:  Recent Labs   Lab Test  05/20/18   0134   AST  34   ALT  21   ALKPHOS  71   BILITOTAL  0.5     Most Recent INR's and Anticoagulation Dosing History:  Anticoagulation  Dose History     There is no flowsheet data to display.        Most Recent 3 Troponin's:No lab results found.  Most Recent Cholesterol Panel:No lab results found.  Most Recent 6 Bacteria Isolates From Any Culture (See EPIC Reports for Culture Details):  Recent Labs   Lab Test  05/20/18   0400  05/20/18   0350  05/20/18   0134  03/26/11   0145   CULT  No growth after 2 days  No growth after 2 days  >100,000 colonies/mL  Escherichia coli  *  No beta hemolytic Streptococcus Group A isolated     Most Recent TSH, T4 and A1c Labs:No lab results found.  Results for orders placed or performed during the hospital encounter of 05/20/18   XR Chest 2 Views    Narrative    XR CHEST 2 VW 5/20/2018 2:01 AM    HISTORY: Fever.    COMPARISON: None.      Impression    IMPRESSION: The lungs are clear. No focal pulmonary opacities. Heart  and mediastinum are unremarkable. No acute cardiopulmonary  abnormalities.    GAY ROMERO MD   CT Abdomen Pelvis Hematuria w/o & w Contrast    Narrative    CT ABDOMEN AND PELVIS WITHOUT AND WITH CONTRAST  5/20/2018 3:27 PM      HISTORY: History of right flank pain, fever. Rule out pyelonephritis  and renal calculi.     COMPARISON: February 22, 2009    TECHNIQUE: Volumetric helical acquisition of CT images from the lung  bases through the symphysis pubis before and after the uneventful  administration of 100 mL Isovue-370 intravenous contrast. Radiation  dose for this scan was reduced using automated exposure control,  adjustment of the mA and/or kV according to patient size, or iterative  reconstruction technique.    FINDINGS: There are no stones seen within either kidney, either  ureter, or the bladder. There is no hydroureter or hydronephrosis.  There is no perinephric fat stranding. Kidneys are normal in size and  configuration. No suspicious filling defects seen in the opacified  intrarenal collecting systems, ureters, or bladder to suggest a  urothelial malignancy. There are single ureters and  intrarenal  collecting systems bilaterally. There is perinephric stranding around  the right kidney which may indicate pyelonephritis. Cortical scarring  is noted compatible with prior infectious change. The liver, spleen,  adrenal glands, and pancreas demonstrate no worrisome focal lesion.   Probable cyst in the spleen superiorly. Normal caliber aorta. No  diverticulitis. Unremarkable gallbladder. Appendix not well seen.   Small amount of free fluid. No free air in the abdomen. There are no  abdominal or pelvic lymph nodes that are abnormal by size criteria.   There are no dilated loops of small intestine or large bowel to  suggest ileus or obstruction.The visualized lung bases demonstrate  mild bibasilar atelectasis and/or infiltrate. Bone windows reveal no  destructive lesions.      Impression    IMPRESSION:   1. No evidence for renal, ureteral, or bladder calculi.  2. No masses or suspicious filling defects within the opacified  urinary collecting system.   3. Perinephric stranding on the right compatible with pyelonephritis.  Differential would include a recently passed stone as well on the  right.  4. Areas of cortical scarring in the right kidney compatible with  prior infectious change.  5. Mild bibasilar atelectasis and/or infiltrate.    JANNETTE CHRISTIE MD

## 2018-05-26 LAB
BACTERIA SPEC CULT: NO GROWTH
BACTERIA SPEC CULT: NO GROWTH
Lab: NORMAL
Lab: NORMAL
SPECIMEN SOURCE: NORMAL
SPECIMEN SOURCE: NORMAL

## 2018-07-16 ENCOUNTER — HOSPITAL ENCOUNTER (EMERGENCY)
Facility: CLINIC | Age: 33
Discharge: HOME | End: 2018-07-17
Attending: EMERGENCY MEDICINE | Admitting: EMERGENCY MEDICINE
Payer: COMMERCIAL

## 2018-07-16 DIAGNOSIS — H66.005 RECURRENT ACUTE SUPPURATIVE OTITIS MEDIA WITHOUT SPONTANEOUS RUPTURE OF LEFT TYMPANIC MEMBRANE: ICD-10-CM

## 2018-07-16 DIAGNOSIS — G44.219 EPISODIC TENSION-TYPE HEADACHE, NOT INTRACTABLE: ICD-10-CM

## 2018-07-16 DIAGNOSIS — H10.32 ACUTE CONJUNCTIVITIS OF LEFT EYE, UNSPECIFIED ACUTE CONJUNCTIVITIS TYPE: ICD-10-CM

## 2018-07-16 LAB
ANION GAP SERPL CALCULATED.3IONS-SCNC: 6 MMOL/L (ref 3–14)
BASOPHILS # BLD AUTO: 0 10E9/L (ref 0–0.2)
BASOPHILS NFR BLD AUTO: 0.5 %
BUN SERPL-MCNC: 19 MG/DL (ref 7–30)
CALCIUM SERPL-MCNC: 8.4 MG/DL (ref 8.5–10.1)
CHLORIDE SERPL-SCNC: 105 MMOL/L (ref 94–109)
CO2 SERPL-SCNC: 28 MMOL/L (ref 20–32)
CREAT SERPL-MCNC: 0.99 MG/DL (ref 0.52–1.04)
DIFFERENTIAL METHOD BLD: NORMAL
EOSINOPHIL # BLD AUTO: 0.2 10E9/L (ref 0–0.7)
EOSINOPHIL NFR BLD AUTO: 1.9 %
ERYTHROCYTE [DISTWIDTH] IN BLOOD BY AUTOMATED COUNT: 12.8 % (ref 10–15)
GFR SERPL CREATININE-BSD FRML MDRD: 64 ML/MIN/1.7M2
GLUCOSE SERPL-MCNC: 92 MG/DL (ref 70–99)
HCT VFR BLD AUTO: 37.5 % (ref 35–47)
HGB BLD-MCNC: 12.7 G/DL (ref 11.7–15.7)
IMM GRANULOCYTES # BLD: 0 10E9/L (ref 0–0.4)
IMM GRANULOCYTES NFR BLD: 0.3 %
LYMPHOCYTES # BLD AUTO: 2.9 10E9/L (ref 0.8–5.3)
LYMPHOCYTES NFR BLD AUTO: 37.5 %
MCH RBC QN AUTO: 31 PG (ref 26.5–33)
MCHC RBC AUTO-ENTMCNC: 33.9 G/DL (ref 31.5–36.5)
MCV RBC AUTO: 92 FL (ref 78–100)
MONOCYTES # BLD AUTO: 0.9 10E9/L (ref 0–1.3)
MONOCYTES NFR BLD AUTO: 11.6 %
NEUTROPHILS # BLD AUTO: 3.8 10E9/L (ref 1.6–8.3)
NEUTROPHILS NFR BLD AUTO: 48.2 %
NRBC # BLD AUTO: 0 10*3/UL
NRBC BLD AUTO-RTO: 0 /100
PLATELET # BLD AUTO: 244 10E9/L (ref 150–450)
POTASSIUM SERPL-SCNC: 3.9 MMOL/L (ref 3.4–5.3)
RBC # BLD AUTO: 4.1 10E12/L (ref 3.8–5.2)
SODIUM SERPL-SCNC: 139 MMOL/L (ref 133–144)
T4 FREE SERPL-MCNC: 1.05 NG/DL (ref 0.76–1.46)
TSH SERPL DL<=0.005 MIU/L-ACNC: 4.23 MU/L (ref 0.4–4)
WBC # BLD AUTO: 7.8 10E9/L (ref 4–11)

## 2018-07-16 PROCEDURE — 80048 BASIC METABOLIC PNL TOTAL CA: CPT | Performed by: PHYSICIAN ASSISTANT

## 2018-07-16 PROCEDURE — 96374 THER/PROPH/DIAG INJ IV PUSH: CPT

## 2018-07-16 PROCEDURE — 84439 ASSAY OF FREE THYROXINE: CPT | Performed by: PHYSICIAN ASSISTANT

## 2018-07-16 PROCEDURE — 96361 HYDRATE IV INFUSION ADD-ON: CPT

## 2018-07-16 PROCEDURE — 96375 TX/PRO/DX INJ NEW DRUG ADDON: CPT

## 2018-07-16 PROCEDURE — 85025 COMPLETE CBC W/AUTO DIFF WBC: CPT | Performed by: PHYSICIAN ASSISTANT

## 2018-07-16 PROCEDURE — 25000128 H RX IP 250 OP 636: Performed by: PHYSICIAN ASSISTANT

## 2018-07-16 PROCEDURE — 84443 ASSAY THYROID STIM HORMONE: CPT | Performed by: PHYSICIAN ASSISTANT

## 2018-07-16 PROCEDURE — 99285 EMERGENCY DEPT VISIT HI MDM: CPT | Mod: 25

## 2018-07-16 RX ORDER — METOCLOPRAMIDE HYDROCHLORIDE 5 MG/ML
5 INJECTION INTRAMUSCULAR; INTRAVENOUS ONCE
Status: COMPLETED | OUTPATIENT
Start: 2018-07-16 | End: 2018-07-16

## 2018-07-16 RX ORDER — DIPHENHYDRAMINE HYDROCHLORIDE 50 MG/ML
25 INJECTION INTRAMUSCULAR; INTRAVENOUS ONCE
Status: COMPLETED | OUTPATIENT
Start: 2018-07-16 | End: 2018-07-16

## 2018-07-16 RX ORDER — KETOROLAC TROMETHAMINE 30 MG/ML
15 INJECTION, SOLUTION INTRAMUSCULAR; INTRAVENOUS ONCE
Status: COMPLETED | OUTPATIENT
Start: 2018-07-16 | End: 2018-07-16

## 2018-07-16 RX ADMIN — METOCLOPRAMIDE 5 MG: 5 INJECTION, SOLUTION INTRAMUSCULAR; INTRAVENOUS at 23:18

## 2018-07-16 RX ADMIN — KETOROLAC TROMETHAMINE 15 MG: 30 INJECTION, SOLUTION INTRAMUSCULAR at 23:19

## 2018-07-16 RX ADMIN — DIPHENHYDRAMINE HYDROCHLORIDE 25 MG: 50 INJECTION, SOLUTION INTRAMUSCULAR; INTRAVENOUS at 23:19

## 2018-07-16 RX ADMIN — SODIUM CHLORIDE 1000 ML: 9 INJECTION, SOLUTION INTRAVENOUS at 23:18

## 2018-07-16 ASSESSMENT — ENCOUNTER SYMPTOMS
ABDOMINAL PAIN: 1
DIARRHEA: 0
HEADACHES: 1
RHINORRHEA: 0
NECK PAIN: 1
SINUS PRESSURE: 0
EYE REDNESS: 1
FEVER: 1
NAUSEA: 0
WEAKNESS: 0
FATIGUE: 1
EYE PAIN: 1
EYE DISCHARGE: 1
PHOTOPHOBIA: 1
VOMITING: 0
FACIAL SWELLING: 0
SHORTNESS OF BREATH: 0
BACK PAIN: 0
SINUS PAIN: 0
NUMBNESS: 0

## 2018-07-16 NOTE — ED AVS SNAPSHOT
Emergency Department    [address] [facility] [address]    Phone:  [phone]    Fax:  [phone]                                       Lilibeth Santizo   MRN: [id number]    Department:   Emergency Department   Date of Visit:  7/16/2018           Patient Information     Date Of Birth          1985        Your diagnoses for this visit were:     Recurrent acute suppurative otitis media without spontaneous rupture of left tympanic membrane     Acute conjunctivitis of left eye, unspecified acute conjunctivitis type     Episodic tension-type headache, not intractable        You were seen by Esteban Melendrez MD.      Follow-up Information     Follow up with YOUR ENT .        Follow up with Eren Michael MD In 3 days.    Specialty:  Family Practice    Contact information:    [address]  [facility] [address] [phone]          Follow up with  Emergency Department.    Specialty:  EMERGENCY MEDICINE    Why:  If symptoms worsen    Contact information:    [address] [facility] [address] [phone]        Discharge Instructions       Discharge Instructions  Otitis Media  You or your child have an ear infection known as otitis media or middle ear infection (otitis = ear, media = middle). These infections often develop after a viral infection, such as a cold. The cold causes swelling around the pressure-equalizing tube of the ear, which allows fluid to build up in the space behind the eardrum (the middle ear). This fluid build-up can trap bacteria and viruses and increase pressure on the eardrum causing pain. Symptoms of an ear infection can include earache/pain and decreased hearing loss. These symptoms often come on suddenly. For children, symptoms may include fever (temperature >100.4 F), pulling on the ear, fussiness, and decreased activity/appetite.  Generally, every Emergency Department visit should have a follow-up clinic visit with either a primary or a  "specialty clinic/provider. Please follow-up as instructed by your emergency provider today.    Return to the Emergency Department if:    Your child becomes very fussy or weak.    The symptoms get worse, or if you develop a severe headache, stiff neck, or new symptoms.    Treatment:    The \"best\" treatment depends on your age, history of previous infections, and any underlying medical problems.    Antibiotics are not given to every patient with an ear infection because studies show that many people with ear infections will improve without using antibiotics. Because antibiotics can have side effects such as diarrhea and stomach upset and can also cause severe allergic reactions, providers are trying to avoid using antibiotics if it is safe for the patient to do so.   In these cases, a prescription for antibiotics may be given to be filled in 24 -48 hours if symptoms are getting worse or not improving (this is often called  wait and see  treatment). If the symptoms are improving, the antibiotic does not need to be taken.     Remember, antibiotics do not treat pain.      Pain medications. You may take a pain medication such as Tylenol  (acetaminophen), Advil  (ibuprofen), Nuprin  (ibuprofen) or Aleve  (naproxen).    Complications:      Tympanic membrane rupture - One possible complication of an ear infection is rupture of the tympanic membrane, or ear drum. This happens because of pressure on the tympanic membrane from the infected fluid. When the tympanic membrane ruptures, you may have pus or blood drain from the ear. It does not hurt when the membrane ruptures, and many people actually feel better because pressure is released. Fortunately, the tympanic membrane usually heals quickly after rupturing, within hours to days. You should keep water out of the ear until you re-check with your provider to be sure the ear drum has healed.       Mastoiditis - Rarely, the area behind the ear can become infected, this area is " "called the mastoid.  If you notice redness and swelling behind your ear, see your provider or return to the Emergency Department immediately.        Hearing loss - The fluid that collects behind the eardrum (called an effusion) can persist for weeks to months after the pain of an ear infection resolves. An effusion causes trouble hearing, which is usually temporary. If the fluid persists, however, it can interfere with the process of learning to speak.   For this reason, children under 2 need to be seen by their pediatrician WITHIN 3 MONTHS to ensure that the fluid has resolved.  If you were given a prescription for medicine here today, be sure to read all of the information (including the package insert) that comes with your prescription.  This will include important information about the medicine, its side effects, and any warnings that you need to know about.  The pharmacist who fills the prescription can provide more information and answer questions you may have about the medicine.  If you have questions or concerns that the pharmacist cannot address, please call or return to the Emergency Department.   Remember that you can always come back to the Emergency Department if you are not able to see your regular provider in the amount of time listed above, if you get any new symptoms, or if there is anything that worries you.  Discharge Instructions  Conjunctivitis  Conjunctivitis, or \"pinkeye\", is inflammation of the conjunctiva, which is the thin membrane that lines the inner surface of the eyelids and the whites of the eyes.   There are four main types of conjunctivitis: viral, bacterial, allergic, and non-specific. Both bacterial and viral conjunctivitis spread easily from one person to another by contact with the eye or another person s hands, by an object the infected person has touched (such as a door handle), or by sharing an object that has touched their eye (such as a towel or pillowcase). Because of this, " children with bacterial conjunctivitis cannot go back to school or  until they have been on antibiotics for 24 hours.  Generally, every Emergency Department visit should have a follow-up clinic visit with either a primary or a specialty clinic/provider. Please follow-up as instructed by your emergency provider today.  VIRAL CONJUNCTIVITIS: The virus that causes the common cold and is often seen as part of a general cold typically causes this type of conjunctivitis.  This type of conjunctivitis is not treated with antibiotics, and usually lasts 3 - 5 days.  An over-the-counter antihistamine/decongestant eye drop may help to relieve the itching and irritation of viral conjunctivitis.  BACTERIAL CONJUNCTIVITIS:  This is treated with an antibiotic ointment or eye drop.  In both bacterial and viral conjunctivitis, do not wear contact lenses until your eye is no longer red.   Your contact case should be thrown away and the contacts disinfected overnight, or replaced if disposable.  NON-SPECIFIC CONJUNCTIVITIS: Sometimes a red eye is caused by other things such as dry eye, chemical exposure, or foreign body in the eye such as dust or eyelash.   All of these problems generally improve on their own within 24 hours.  ALLERGIC CONJUNCTIVITIS: These are eye symptoms caused by allergies. This type of conjunctivitis will be treated with allergy medications.    Return to the Emergency Department if:    If you have blurry vision.    If you have increasing eye pain or drainage.    If you have new redness or swelling in the skin around the eye.  If you were given a prescription for medicine here today, be sure to read all of the information (including the package insert) that comes with your prescription.  This will include important information about the medicine, its side effects, and any warnings that you need to know about.  The pharmacist who fills the prescription can provide more information and answer questions you may  have about the medicine.  If you have questions or concerns that the pharmacist cannot address, please call or return to the Emergency Department.   Remember that you can always come back to the Emergency Department if you are not able to see your regular provider in the amount of time listed above, if you get any new symptoms, or if there is anything that worries you.      24 Hour Appointment Hotline       To make an appointment at any [facility], call [phone] ([phone]). If you don't have a family doctor or clinic, we will help you find one. [facility] clinics are conveniently located to serve the needs of you and your family.             Review of your medicines      START taking        Dose / Directions Last dose taken    amoxicillin-clavulanate 875-125 MG per tablet   Commonly known as:  AUGMENTIN   Dose:  1 tablet   Quantity:  20 tablet        Take 1 tablet by mouth 2 times daily for 10 days   Refills:  0        ciprofloxacin-dexamethasone otic suspension   Commonly known as:  CIPRODEX   Dose:  4 drop   Quantity:  4 mL        Place 4 drops Into the left ear 2 times daily for 10 days   Refills:  0        tobramycin-dexamethasone 0.3-0.1 % ophthalmic susp   Commonly known as:  TOBRADEX   Dose:  1 drop   Quantity:  1 Bottle        Place 1 drop Into the left eye every 4 hours (while awake) for 7 days   Refills:  0          Our records show that you are taking the medicines listed below. If these are incorrect, please call your family doctor or clinic.        Dose / Directions Last dose taken    BLINK TEARS 0.25 % Soln ophthalmic solution   Dose:  2 drop   Generic drug:  polyethylene glycol 400        Place 2 drops into both eyes 2 times daily as needed for dry eyes   Refills:  0        Cranberry 500 MG Caps   Dose:  1 capsule        Take 1 capsule by mouth daily   Refills:  0        guaiFENesin 600 MG 12 hr tablet   Commonly known as:  MUCINEX   Dose:  600 mg        Take 600 mg by mouth 2 times  daily   Refills:  0        ibuprofen 600 MG tablet   Commonly known as:  ADVIL/MOTRIN   Dose:  600 mg   Quantity:  60 tablet        Take 1 tablet (600 mg) by mouth every 6 hours as needed for other (mild pain)   Refills:  0        lactobacillus rhamnosus (GG) capsule   Dose:  1 capsule   Quantity:  30 capsule        Take 1 capsule by mouth 2 times daily   Refills:  0        loratadine 10 MG tablet   Commonly known as:  CLARITIN   Dose:  10 mg        Take 10 mg by mouth daily   Refills:  0        multivitamin, therapeutic with minerals Tabs tablet   Dose:  1 tablet        Take 1 tablet by mouth daily   Refills:  0        traMADol 50 MG tablet   Commonly known as:  ULTRAM   Dose:  50 mg   Quantity:  10 tablet        Take 1 tablet (50 mg) by mouth every 6 hours as needed for moderate pain   Refills:  0        VITAMIN D-3 PO   Dose:  1000 Units        Take 1,000 Units by mouth daily   Refills:  0                Prescriptions were sent or printed at these locations (3 Prescriptions)                   Other Prescriptions                Printed at Department/Unit printer (3 of 3)         amoxicillin-clavulanate (AUGMENTIN) 875-125 MG per tablet               ciprofloxacin-dexamethasone (CIPRODEX) otic suspension               tobramycin-dexamethasone (TOBRADEX) 0.3-0.1 % ophthalmic susp                Procedures and tests performed during your visit     Basic metabolic panel    CBC with platelets differential    Head CT w/o contrast    T4 free    TSH with free T4 reflex      Orders Needing Specimen Collection     Ordered          07/16/18 2307  UA with Microscopic - STAT, Prio: STAT, Needs to be Collected     Scheduled Task Status   07/16/18 2308 Collect UA with Microscopic Open   Order Class:  PCU Collect                07/16/18 2307  HCG qualitative urine - STAT, Prio: STAT, Needs to be Collected     Scheduled Task Status   07/16/18 2308 Collect HCG qualitative urine Open   Order Class:  PCU Collect                   Pending Results     Date and Time Order Name Status Description    7/16/2018 2314 Head CT w/o contrast Preliminary             Pending Culture Results     No orders found for last 3 day(s).            Pending Results Instructions     If you had any lab results that were not finalized at the time of your Discharge, you can call the ED Lab Result RN at [phone]. You will be contacted by this team for any positive Lab results or changes in treatment. The nurses are available 7 days a week from 10A to 6:30P.  You can leave a message 24 hours per day and they will return your call.        Test Results From Your Hospital Stay        7/16/2018 11:18 PM      Component Results     Component Value Ref Range & Units Status    WBC 7.8 4.0 - 11.0 10e9/L Final    RBC Count 4.10 3.8 - 5.2 10e12/L Final    Hemoglobin 12.7 11.7 - 15.7 g/dL Final    Hematocrit 37.5 35.0 - 47.0 % Final    MCV 92 78 - 100 fl Final    MCH 31.0 26.5 - 33.0 pg Final    MCHC 33.9 31.5 - 36.5 g/dL Final    RDW 12.8 10.0 - 15.0 % Final    Platelet Count 244 150 - 450 10e9/L Final    Diff Method Automated Method  Final    % Neutrophils 48.2 % Final    % Lymphocytes 37.5 % Final    % Monocytes 11.6 % Final    % Eosinophils 1.9 % Final    % Basophils 0.5 % Final    % Immature Granulocytes 0.3 % Final    Nucleated RBCs 0 0 /100 Final    Absolute Neutrophil 3.8 1.6 - 8.3 10e9/L Final    Absolute Lymphocytes 2.9 0.8 - 5.3 10e9/L Final    Absolute Monocytes 0.9 0.0 - 1.3 10e9/L Final    Absolute Eosinophils 0.2 0.0 - 0.7 10e9/L Final    Absolute Basophils 0.0 0.0 - 0.2 10e9/L Final    Abs Immature Granulocytes 0.0 0 - 0.4 10e9/L Final    Absolute Nucleated RBC 0.0  Final         7/16/2018 11:37 PM      Component Results     Component Value Ref Range & Units Status    Sodium 139 133 - 144 mmol/L Final    Potassium 3.9 3.4 - 5.3 mmol/L Final    Chloride 105 94 - 109 mmol/L Final    Carbon Dioxide 28 20 - 32 mmol/L Final    Anion Gap 6 3 - 14 mmol/L Final     Glucose 92 70 - 99 mg/dL Final    Urea Nitrogen 19 7 - 30 mg/dL Final    Creatinine 0.99 0.52 - 1.04 mg/dL Final    GFR Estimate 64 >60 mL/min/1.7m2 Final    Non  GFR Calc    GFR Estimate If Black 77 >60 mL/min/1.7m2 Final    African American GFR Calc    Calcium 8.4 (L) 8.5 - 10.1 mg/dL Final         7/17/2018 12:42 AM      Narrative     CT HEAD W/O CONTRAST  7/17/2018 12:29 AM     HISTORY: History of left-sided mastoiditis with mastoidectomy. Patient  now has headache with low grade fever.    TECHNIQUE: Axial images of the head and coronal reformations without  IV contrast material. Radiation dose for this scan was reduced using  automated exposure control, adjustment of the mA and/or kV according  to patient size, or iterative reconstruction technique.    COMPARISON: None.    FINDINGS: No intracranial hemorrhage, mass or mass effect. No acute  infarct identified. No shift of midline structures. Mild basal ganglia  calcifications. No skull fractures. Postoperative changes from prior  left mastoid sinus surgery. No destructive bone lesions. Opacification  of a few posterior right mastoid air cells.        Impression     IMPRESSION:  1. No acute intracranial findings.  2. Prior left mastoid sinus surgery. Opacification of a few  right-sided mastoid air cells.         7/16/2018 11:44 PM      Component Results     Component Value Ref Range & Units Status    TSH 4.23 (H) 0.40 - 4.00 mU/L Final         7/16/2018 11:56 PM      Component Results     Component Value Ref Range & Units Status    T4 Free 1.05 0.76 - 1.46 ng/dL Final                Clinical Quality Measure: Blood Pressure Screening     Your blood pressure was checked while you were in the emergency department today. The last reading we obtained was  BP: 147/89 . Please read the guidelines below about what these numbers mean and what you should do about them.  If your systolic blood pressure (the top number) is less than 120 and your diastolic  "blood pressure (the bottom number) is less than 80, then your blood pressure is normal. There is nothing more that you need to do about it.  If your systolic blood pressure (the top number) is 120-139 or your diastolic blood pressure (the bottom number) is 80-89, your blood pressure may be higher than it should be. You should have your blood pressure rechecked within a year by a primary care provider.  If your systolic blood pressure (the top number) is 140 or greater or your diastolic blood pressure (the bottom number) is 90 or greater, you may have high blood pressure. High blood pressure is treatable, but if left untreated over time it can put you at risk for heart attack, stroke, or kidney failure. You should have your blood pressure rechecked by a primary care provider within the next 4 weeks.  If your provider in the emergency department today gave you specific instructions to follow-up with your doctor or provider even sooner than that, you should follow that instruction and not wait for up to 4 weeks for your follow-up visit.        Thank you for choosing [facility]       Thank you for choosing [facility] for your care. Our goal is always to provide you with excellent care. Hearing back from our patients is one way we can continue to improve our services. Please take a few minutes to complete the written survey that you may receive in the mail after you visit with us. Thank you!        [employer] Information     [employer] lets you send messages to your doctor, view your test results, renew your prescriptions, schedule appointments and more. To sign up, go to [email or web address] . Click on \"Log in\" on the left side of the screen, which will take you to the Welcome page. Then click on \"Sign up Now\" on the right side of the page.     You will be asked to enter the access code listed below, as well as some personal information. Please follow the directions to create your username and password.     Your access code " is: [email or web address]  Expires: 2018  8:49 AM     Your access code will  in 90 days. If you need help or a new code, please call your [facility] clinic or [phone].        Care EveryWhere ID     This is your Care EveryWhere ID. This could be used by other organizations to access your [facility] medical records  [id number]        Equal Access to Services     PADMINI GARZON : Hadii zhane lópezo Sodanica, waaxda luraúladaha, qaybta kaalmada adeoliva, nathen hamilton . So [facility] [phone].    ATENCIÓN: Si habla español, tiene a harrison disposición servicios gratuitos de asistencia lingüística. Llame al [phone].    We comply with applicable federal civil rights laws and [place] laws. We do not discriminate on the basis of race, color, national origin, age, disability, sex, sexual orientation, or gender identity.            After Visit Summary       This is your record. Keep this with you and show to your community pharmacist(s) and doctor(s) at your next visit.

## 2018-07-16 NOTE — ED AVS SNAPSHOT
Emergency Department    [address] [place] [address]    Phone:  [phone]    Fax:  [phone]                                       Lilibeth Santizo   MRN: [id number]    Department:   Emergency Department   Date of Visit:  7/16/2018           After Visit Summary Signature Page     I have received my discharge instructions, and my questions have been answered. I have discussed any challenges I see with this plan with the nurse or doctor.    ..........................................................................................................................................  Patient/Patient Representative Signature      ..........................................................................................................................................  Patient Representative Print Name and Relationship to Patient    ..................................................               ................................................  Date                                            Time    ..........................................................................................................................................  Reviewed by Signature/Title    ...................................................              ..............................................  Date                                                            Time

## 2018-07-17 ENCOUNTER — APPOINTMENT (OUTPATIENT)
Dept: CT IMAGING | Facility: CLINIC | Age: 33
End: 2018-07-17
Attending: PHYSICIAN ASSISTANT
Payer: COMMERCIAL

## 2018-07-17 VITALS
SYSTOLIC BLOOD PRESSURE: 147 MMHG | DIASTOLIC BLOOD PRESSURE: 89 MMHG | HEIGHT: 65 IN | BODY MASS INDEX: 33.45 KG/M2 | TEMPERATURE: 98.5 F | WEIGHT: 200.8 LBS | OXYGEN SATURATION: 100 % | RESPIRATION RATE: 18 BRPM

## 2018-07-17 PROCEDURE — 70450 CT HEAD/BRAIN W/O DYE: CPT

## 2018-07-17 PROCEDURE — 96375 TX/PRO/DX INJ NEW DRUG ADDON: CPT

## 2018-07-17 PROCEDURE — 25000128 H RX IP 250 OP 636: Performed by: PHYSICIAN ASSISTANT

## 2018-07-17 RX ORDER — GABAPENTIN 300 MG/1
CAPSULE ORAL
Qty: 42 CAPSULE | Refills: 0 | Status: SHIPPED | OUTPATIENT
Start: 2018-07-17

## 2018-07-17 RX ORDER — PROPARACAINE HYDROCHLORIDE 5 MG/ML
SOLUTION/ DROPS OPHTHALMIC
Status: DISCONTINUED
Start: 2018-07-17 | End: 2018-07-17 | Stop reason: HOSPADM

## 2018-07-17 RX ORDER — CIPROFLOXACIN AND DEXAMETHASONE 3; 1 MG/ML; MG/ML
4 SUSPENSION/ DROPS AURICULAR (OTIC) 2 TIMES DAILY
Qty: 4 ML | Refills: 0 | Status: SHIPPED | OUTPATIENT
Start: 2018-07-17 | End: 2018-07-27

## 2018-07-17 RX ORDER — TOBRAMYCIN AND DEXAMETHASONE 3; 1 MG/ML; MG/ML
1 SUSPENSION/ DROPS OPHTHALMIC
Qty: 1 BOTTLE | Refills: 0 | Status: SHIPPED | OUTPATIENT
Start: 2018-07-17 | End: 2018-07-24

## 2018-07-17 RX ORDER — DEXAMETHASONE SODIUM PHOSPHATE 10 MG/ML
10 INJECTION, SOLUTION INTRAMUSCULAR; INTRAVENOUS ONCE
Status: COMPLETED | OUTPATIENT
Start: 2018-07-17 | End: 2018-07-17

## 2018-07-17 RX ADMIN — DEXAMETHASONE SODIUM PHOSPHATE 10 MG: 10 INJECTION, SOLUTION INTRAMUSCULAR; INTRAVENOUS at 00:52

## 2018-07-17 NOTE — DISCHARGE INSTRUCTIONS
"Discharge Instructions  Otitis Media  You or your child have an ear infection known as otitis media or middle ear infection (otitis = ear, media = middle). These infections often develop after a viral infection, such as a cold. The cold causes swelling around the pressure-equalizing tube of the ear, which allows fluid to build up in the space behind the eardrum (the middle ear). This fluid build-up can trap bacteria and viruses and increase pressure on the eardrum causing pain. Symptoms of an ear infection can include earache/pain and decreased hearing loss. These symptoms often come on suddenly. For children, symptoms may include fever (temperature >100.4 F), pulling on the ear, fussiness, and decreased activity/appetite.  Generally, every Emergency Department visit should have a follow-up clinic visit with either a primary or a specialty clinic/provider. Please follow-up as instructed by your emergency provider today.    Return to the Emergency Department if:    Your child becomes very fussy or weak.    The symptoms get worse, or if you develop a severe headache, stiff neck, or new symptoms.    Treatment:    The \"best\" treatment depends on your age, history of previous infections, and any underlying medical problems.    Antibiotics are not given to every patient with an ear infection because studies show that many people with ear infections will improve without using antibiotics. Because antibiotics can have side effects such as diarrhea and stomach upset and can also cause severe allergic reactions, providers are trying to avoid using antibiotics if it is safe for the patient to do so.   In these cases, a prescription for antibiotics may be given to be filled in 24 -48 hours if symptoms are getting worse or not improving (this is often called  wait and see  treatment). If the symptoms are improving, the antibiotic does not need to be taken.     Remember, antibiotics do not treat pain.      Pain medications. You " may take a pain medication such as Tylenol  (acetaminophen), Advil  (ibuprofen), Nuprin  (ibuprofen) or Aleve  (naproxen).    Complications:      Tympanic membrane rupture - One possible complication of an ear infection is rupture of the tympanic membrane, or ear drum. This happens because of pressure on the tympanic membrane from the infected fluid. When the tympanic membrane ruptures, you may have pus or blood drain from the ear. It does not hurt when the membrane ruptures, and many people actually feel better because pressure is released. Fortunately, the tympanic membrane usually heals quickly after rupturing, within hours to days. You should keep water out of the ear until you re-check with your provider to be sure the ear drum has healed.       Mastoiditis - Rarely, the area behind the ear can become infected, this area is called the mastoid.  If you notice redness and swelling behind your ear, see your provider or return to the Emergency Department immediately.        Hearing loss - The fluid that collects behind the eardrum (called an effusion) can persist for weeks to months after the pain of an ear infection resolves. An effusion causes trouble hearing, which is usually temporary. If the fluid persists, however, it can interfere with the process of learning to speak.   For this reason, children under 2 need to be seen by their pediatrician WITHIN 3 MONTHS to ensure that the fluid has resolved.  If you were given a prescription for medicine here today, be sure to read all of the information (including the package insert) that comes with your prescription.  This will include important information about the medicine, its side effects, and any warnings that you need to know about.  The pharmacist who fills the prescription can provide more information and answer questions you may have about the medicine.  If you have questions or concerns that the pharmacist cannot address, please call or return to the  "Emergency Department.   Remember that you can always come back to the Emergency Department if you are not able to see your regular provider in the amount of time listed above, if you get any new symptoms, or if there is anything that worries you.  Discharge Instructions  Conjunctivitis  Conjunctivitis, or \"pinkeye\", is inflammation of the conjunctiva, which is the thin membrane that lines the inner surface of the eyelids and the whites of the eyes.   There are four main types of conjunctivitis: viral, bacterial, allergic, and non-specific. Both bacterial and viral conjunctivitis spread easily from one person to another by contact with the eye or another person s hands, by an object the infected person has touched (such as a door handle), or by sharing an object that has touched their eye (such as a towel or pillowcase). Because of this, children with bacterial conjunctivitis cannot go back to school or  until they have been on antibiotics for 24 hours.  Generally, every Emergency Department visit should have a follow-up clinic visit with either a primary or a specialty clinic/provider. Please follow-up as instructed by your emergency provider today.  VIRAL CONJUNCTIVITIS: The virus that causes the common cold and is often seen as part of a general cold typically causes this type of conjunctivitis.  This type of conjunctivitis is not treated with antibiotics, and usually lasts 3 - 5 days.  An over-the-counter antihistamine/decongestant eye drop may help to relieve the itching and irritation of viral conjunctivitis.  BACTERIAL CONJUNCTIVITIS:  This is treated with an antibiotic ointment or eye drop.  In both bacterial and viral conjunctivitis, do not wear contact lenses until your eye is no longer red.   Your contact case should be thrown away and the contacts disinfected overnight, or replaced if disposable.  NON-SPECIFIC CONJUNCTIVITIS: Sometimes a red eye is caused by other things such as dry eye, chemical " exposure, or foreign body in the eye such as dust or eyelash.   All of these problems generally improve on their own within 24 hours.  ALLERGIC CONJUNCTIVITIS: These are eye symptoms caused by allergies. This type of conjunctivitis will be treated with allergy medications.    Return to the Emergency Department if:    If you have blurry vision.    If you have increasing eye pain or drainage.    If you have new redness or swelling in the skin around the eye.  If you were given a prescription for medicine here today, be sure to read all of the information (including the package insert) that comes with your prescription.  This will include important information about the medicine, its side effects, and any warnings that you need to know about.  The pharmacist who fills the prescription can provide more information and answer questions you may have about the medicine.  If you have questions or concerns that the pharmacist cannot address, please call or return to the Emergency Department.   Remember that you can always come back to the Emergency Department if you are not able to see your regular provider in the amount of time listed above, if you get any new symptoms, or if there is anything that worries you.

## 2018-07-17 NOTE — ED PROVIDER NOTES
Emergency Department Attending Supervision Note  7/17/2018  12:31 AM      I evaluated this patient in conjunction with Criss Rodriges PA-C.      Briefly, the patient presented with fullness to the left ear, pain going from her ear down to her neck reminiscent of prior nerve pain, headache, nausea.  She denies any fevers or chills denies any significant respiratory or abdominal complaints.  Patient is a complex history most notably for mastoidectomy at the age of 10, mastoiditis this year as well another admission for pyelonephritis and diarrhea.      On my exam,  GENERAL: well developed, pleasant  HEAD: atraumatic  EYES: pupils reactive, extraocular muscles intact, conjunctivae normal  ENT:  mucus membranes moist  NECK:  trachea midline, normal range of motion  RESPIRATORY: no tachypnea, breath sounds clear to auscultation   CVS: normal S1/S2, no murmurs, intact distal pulses  ABDOMEN: soft, nontender, nondistention  MUSCULOSKELETAL: no deformities  SKIN: warm and dry, no acute rashes or ulceration  NEURO: GCS 15, cranial nerves intact, alert and oriented x3  PSYCH:  Mood/affect normal      Patient has a tube in her left ear with fluid and certainly abnormal anatomy.  There is no erythema but general sense of milky yellow discoloration and fullness.  Right ear shows significant scarring.  Patient has no significant pain over the mastoid bone.  Laboratory workup is fairly unremarkable CT shows chronic findings.  We did talk to radiology about imaging suggested CT head tonight if need be may need CT fine cuts of the temporal bones.  Patient otherwise looks well do not feel we need dedicated temporal bone CTs.  Patient does feel fullness and discomfort to the left ear will cover with antibiotics which is a slight challenge.  She has been on Augmentin in the past and has tolerated it but ended up being admitted being changed him to clindamycin but developed significant diarrhea so she is hesitant to take clindamycin  again.  Will try Augmentin as well as topical eyedrops given her conjunctivitis and have her follow-up with ENT who knows her case well.        Diagnosis    ICD-10-CM   1. Recurrent acute suppurative otitis media without spontaneous rupture of left tympanic membrane H66.005   2. Acute conjunctivitis of left eye, unspecified acute conjunctivitis type H10.32   3. Episodic tension-type headache, not intractable G44.219         Esteban Melendrez MD     I, Josephine Clark, am serving as a scribe at 12:31 AM on 7/17/2018 to document services personally performed by Esteban Melendrez MD based on my observations and the provider's statements to me.

## 2018-07-17 NOTE — ED PROVIDER NOTES
History     Chief Complaint:  Headache    HPI   Lilibeth Santizo is a 33 year old female who presents to the ED for evaluation of headache. The patient reports feeling unwell over the last few days. She reports having a low grade temperature of 99.9 over the weekend along with fatigued and tiredness. The patient also reports a headache that developed over the weekend, noting it to be more left sided and affecting her left ear. She does note a history of chronic ear infections and does still have a tube in her left ear. Yesterday, she noticed intermittent irritation and blurriness in the left periphery of the eye that felt like a cut. She also notes having some left eye discharge as well. Patient denies any facial trauma. Today, she woke up feeling well and was productive most of the day. However, her headache has since worsened and stretches around to the right temporal area now as well. She states that her headaches are typically correlated with an ear infection and thus is concerned about the possibility of that. She did not develop photophobia and sound sensitivity until later this afternoon. She has never been diagnosed with migraines in the past. Patient does note a history of sinus problems and is concerned about this as well. She reports some intermittent tingling in her left fingers but denies any numbness or weakness. The patient also complains of neck pain along with right sided abdominal pain and pleuritic chest pain on examination. She denies any shortness of breath, nausea, vomiting, diarrhea, or any other symptoms.     Of note, the patient was hospitalized back in March 2018 for mastoiditis and possible meningitis. She was given clindamycin for this. She was also diagnosed with having nerve pain in the left side of her neck for which she was given gabapentin and states it helped. She was discharged 4 days later but then was readmitted 2 days later where she stayed for 2.5 weeks due to diarrhea after  "taking the clindamycin. She denies having C. diff during this time.    Allergies:  Cefixime  Penicillins  Clindamycin  Suprax     Medications:    Cholecalciferol (VITAMIN D-3 PO)  Cranberry 500 MG CAPS  guaiFENesin (MUCINEX) 600 MG 12 hr tablet  ibuprofen (ADVIL/MOTRIN) 600 MG tablet  lactobacillus rhamnosus, GG, (CULTURELL) capsule  loratadine (CLARITIN) 10 MG tablet  multivitamin, therapeutic with minerals (THERA-VIT-M) TABS tablet  polyethylene glycol 400 (BLINK TEARS) 0.25 % SOLN ophthalmic solution  traMADol (ULTRAM) 50 MG tablet     Past Medical History:    Pyelonephritis  Mastoiditis    Past Surgical History:    Multiple ear surgery  C nonspecific procedure - ankle    Laparoscopic tubal ligation    Family History:    History reviewed. No pertinent family history.     Social History:  Smoking status: Never  Alcohol use: Yes  Marital Status:       Review of Systems   Constitutional: Positive for fatigue and fever.   HENT: Positive for ear pain (left). Negative for congestion, ear discharge, facial swelling, rhinorrhea, sinus pain and sinus pressure.    Eyes: Positive for photophobia, pain (left), discharge (left), redness (left) and visual disturbance (blurry).   Respiratory: Negative for shortness of breath.    Cardiovascular: Positive for chest pain.   Gastrointestinal: Positive for abdominal pain. Negative for diarrhea, nausea and vomiting.   Musculoskeletal: Positive for neck pain. Negative for back pain.   Neurological: Positive for headaches. Negative for weakness and numbness.   All other systems reviewed and are negative.    Physical Exam   Patient Vitals for the past 24 hrs:   BP Temp Temp src Heart Rate SpO2 Height Weight   18 2138 147/89 98.5  F (36.9  C) Oral 87 95 % 1.651 m (5' 5\") 91.1 kg (200 lb 12.8 oz)     Physical Exam  General: Alert and interactive. Appears well. Cooperative and pleasant.   Eyes: The pupils are equal and round. EOMs intact. No scleral icterus.  ENT: No " abnormalities to the external nose or ears. Mucous membranes moist. Posterior oropharynx is non-erythematous. Left TM has tube in the center. Some erythema on the superior aspect of the tube, inferior to tube is yellow and discolored. No obvious drainage. Right TM is scarred chronically.   Neck: Trachea is in the midline. No nuchal rigidity.     CV: Regular rate and rhythm. S1 and S2 normal without murmur, click, gallop or rub.   Resp: Breath sounds are clear bilaterally, without rhonchi, wheezes, rales. Non-labored, no retractions or accessory muscle use.     GI: Abdomen is soft without distension. No tenderness to palpation. No peritoneal signs.    MS: Moving all extremities well. Good muscle tone.   Skin: Warm and dry. No rash or lesions noted.  Neuro: Alert and oriented x 3. No focal neurologic deficits. Good strength and sensation in upper and lower extremities.    Psych: Awake. Alert.  Normal affect. Appropriate interactions.  Lymph: No anterior or posterior cervical lymphadenopathy noted.    Emergency Department Course     Imaging:  Radiographic findings were communicated with the patient who voiced understanding of the findings.    CT-scan Head w/o contrast:  IMPRESSION:  1. No acute intracranial findings.  2. Prior left mastoid sinus surgery. Opacification of a few  right-sided mastoid air cells.  Result per radiology.     Laboratory:  CBC: WNL (WBC 7.8, HGB 12.7, )  BMP: Calcium 8.4 (L) o/w WNL (Creatinine 0.99)  TSH: 4.23 (H)  T4 free: 1.05    Interventions:  2318: NS 1L IV Bolus  2318: Reglan 5mg IV injection  2319: Benadryl 25mg IV injection  2319: Toradol 15mg IM injection  0052: Decadron 10mg IV injection    Emergency Department Course:  Past medical records, nursing notes, and vitals reviewed.  2244: I performed an exam of the patient and obtained history, as documented above. GCS 15.    IV inserted and blood drawn.    The patient was sent for a CT-scan while in the emergency department,  findings above.    0105: I rechecked the patient. Findings and plan explained to the Patient. Patient discharged home with instructions regarding supportive care, medications, and reasons to return. The importance of close follow-up was reviewed.     Impression & Plan    Medical Decision Making: Lilibeth Santizo is a 33 year old female with a history of mastoidectomy at age 10, and mastoiditis in March of this year who presents for evaluation of fullness of the left ear and headache.  On exam, the patient has a tube in her left ear with some abnormal anatomy. There is some slight erythema in the superior aspect of the tube some yellow discoloration. Right ear has significant scarring from previous ear infections. Patient has no significant pain over percussion of the mastoid bone. Laboratory workup is unremarkable and CT scan of the head shows no intracranial findings and no acute mastoid changes that would suggest mastoiditis.      Otitis media: Given that the patient has a chronic history of otitis media with previous mastoiditis, I will start the patient on Augmentin for 10 days and Ciprodex drops. I request that she follow-up with her ENT physician in the next couple of days for recheck, as they know her well. I did discuss the case with the on-call radiologist, who suggested that a CT scan of the head will be necessary to make sure there are no acute and obvious changes to the mastoid bone, but that temporal CT scan would be necessary in the future for full mastoiditis workup.The patient looks well and has been afebrile, and I do not think serial temporal bone CT scans are necessary at this point. The patient has a previous allergy to penicillin as a child, but she has tolerated Augmentin in the past.  She refuses clindamycin, as this has given her significant diarrhea in the past, which actually required admission in March. Given that I am starting her on Augmentin (which is prone to cause diarrhea), I have  requested that she take probiotics while on this antibiotic.    Acute conjunctivitis of left eye: Patient is a contact lens wearer and has been wearing her contacts at night.  She presents with redness and some obvious drainage of the left eye.  Fluorescein staining with Woods lamp detection showed no abnormalities, including no corneal abrasion or laceration. I will treat her with Tobramycin eyedrops for the next week.    Tension headache: I do think the patient's headache is probably related to her congestion and likely otitis media. I provided various medications here, including Reglan, Toradol, Benadryl and Decadron. There is no chance of pregnancy. Her symptoms did improve prior to discharge. Given that the inflammation in her left ear has caused some significant nerve pain, I also provided her with a taper of Gabapentin to use for the next 3 weeks. I have given her a recommendation for a primary care provider to use for further management of these symptoms.    At this point, the patient's vitals are stable and she is safe for discharge.    Diagnosis:    ICD-10-CM   1. Recurrent acute suppurative otitis media without spontaneous rupture of left tympanic membrane H66.005   2. Acute conjunctivitis of left eye, unspecified acute conjunctivitis type H10.32   3. Episodic tension-type headache, not intractable G44.219       Disposition: Discharged to home    Discharge Medications:  New Prescriptions    AMOXICILLIN-CLAVULANATE (AUGMENTIN) 875-125 MG PER TABLET    Take 1 tablet by mouth 2 times daily for 10 days    CIPROFLOXACIN-DEXAMETHASONE (CIPRODEX) OTIC SUSPENSION    Place 4 drops Into the left ear 2 times daily for 10 days    TOBRAMYCIN-DEXAMETHASONE (TOBRADEX) 0.3-0.1 % OPHTHALMIC SUSP    Place 1 drop Into the left eye every 4 hours (while awake) for 7 days   Gabapentin taper (300 mg once daily x 7 days, 300 mg twice daily x 7 days, 300 mg thrice daily x 7 days)       Criss AGOSTO PA-C, interviewed the  patient, explained the course of action and discussed the patient with Dr. Melendrez, who then evaluated the patient.    Josephine Clark  7/16/2018    EMERGENCY DEPARTMENT  I, Josephine Clark, am serving as a scribe at 10:44 PM on 7/16/2018 to document services personally performed by Criss Rodriges PA-C, based on my observations and the provider's statements to me.        Criss Rodriges PA-C  07/17/18 0215

## 2018-07-19 ENCOUNTER — OFFICE VISIT (OUTPATIENT)
Dept: URGENT CARE | Facility: URGENT CARE | Age: 33
End: 2018-07-19
Payer: COMMERCIAL

## 2018-07-19 VITALS
SYSTOLIC BLOOD PRESSURE: 100 MMHG | TEMPERATURE: 99.2 F | HEART RATE: 60 BPM | BODY MASS INDEX: 33.78 KG/M2 | WEIGHT: 203 LBS | RESPIRATION RATE: 12 BRPM | DIASTOLIC BLOOD PRESSURE: 72 MMHG | OXYGEN SATURATION: 99 %

## 2018-07-19 DIAGNOSIS — R11.0 NAUSEA: ICD-10-CM

## 2018-07-19 DIAGNOSIS — S61.212A LACERATION OF RIGHT MIDDLE FINGER WITHOUT FOREIGN BODY, NAIL DAMAGE STATUS UNSPECIFIED, INITIAL ENCOUNTER: Primary | ICD-10-CM

## 2018-07-19 DIAGNOSIS — M79.644 PAIN OF FINGER OF RIGHT HAND: ICD-10-CM

## 2018-07-19 PROCEDURE — 90471 IMMUNIZATION ADMIN: CPT | Performed by: FAMILY MEDICINE

## 2018-07-19 PROCEDURE — 90715 TDAP VACCINE 7 YRS/> IM: CPT | Performed by: FAMILY MEDICINE

## 2018-07-19 PROCEDURE — 99207 ZZC RSCC CODE FOR CODING REVIEW: CPT | Performed by: FAMILY MEDICINE

## 2018-07-19 PROCEDURE — 99207 ZZC NO CHARGE LOS: CPT | Performed by: FAMILY MEDICINE

## 2018-07-19 RX ORDER — LEVOTHYROXINE SODIUM 100 UG/1
100 TABLET ORAL DAILY
COMMUNITY

## 2018-07-19 RX ORDER — ACETAMINOPHEN 325 MG/1
650 TABLET ORAL ONCE
Qty: 2 TABLET | Refills: 0
Start: 2018-07-19 | End: 2018-07-19

## 2018-07-19 RX ORDER — ONDANSETRON 4 MG/1
4 TABLET, FILM COATED ORAL ONCE
Qty: 1 TABLET | Refills: 0
Start: 2018-07-19 | End: 2018-07-19

## 2018-07-19 NOTE — MR AVS SNAPSHOT
After Visit Summary   7/19/2018    Lilibeth Santizo    MRN: [id number]           Patient Information     Date Of Birth          1985        Visit Information        Provider Department      7/19/2018 6:45 PM Marija Perales MD [facility]        Today's Diagnoses     Laceration of right middle finger without foreign body, nail damage status unspecified, initial encounter    -  1    Nausea        Pain of finger of right hand          Care Instructions      Extremity Laceration: Skin Glue  A laceration is a cut through the skin. You have a laceration that has been closed with skin glue. This is used on cuts that have smooth edges and are not infected. It's best used on straight, clean cuts on areas that do not get a lot of tension.  You may need a tetanus shot. This is given if you have no record of a shot, and the object that caused the cut may lead to tetanus.  Home care    Your healthcare provider may prescribe an antibiotic. This is to help prevent infection. Follow all instructions for taking this medicine. Take the medicine every day until it is gone or you are told to stop. You should not have any left over.    The healthcare provider may prescribe medicines for pain. Follow instructions for taking them.    Follow the healthcare provider s instructions on how to care for the cut.    No bandage is needed. Skin glue peels off on its own within 5 to 10 days. Most skin wounds heal within 10 days.    Keep the wound clean. You may shower or bathe as usual, but do not use soaps, lotions, or ointments on the wound area. Do not scrub the wound. After bathing, pat the wound dry with a soft towel.    Don't scratch, rub, or pick at the film. Don't place tape directly over the film.    Don't put liquids such as peroxide, ointments, or creams on the wound while the skin glue is in place. Many oil based products can weaken and dissolve the glue.    Don't do any activities that  may reinjure your wound.    Don't do any activities that cause heavy sweating. Protect the wound from sunlight.    Most skin wounds heal without problems. But an infection sometimes occurs even with proper treatment. Watch for the signs of infection listed below.  Follow-up care  Follow up as directed with your healthcare provider, or as advised.  When to seek medical advice  Call your healthcare provider right away if any of these occur:    Wound bleeding not controlled by direct pressure    Signs of infection, including increasing pain in the wound, increasing wound redness or swelling, or pus or bad odor coming from the wound    Fever of 100.4 F (38. C) or higher, or as directed by your healthcare provider    Wound edges reopen    Wound changes colors    Numbness around the wound     Decreased movement around the injured area  Date Last Reviewed: 7/1/2017 2000-2017 [employer]. [address] [place] [address]. All rights reserved. This information is not intended as a substitute for professional medical care. Always follow your healthcare professional's instructions.                Follow-ups after your visit        Who to contact     If you have questions or need follow up information about today's clinic visit or your schedule please contact [facility] directly at [phone].  Normal or non-critical lab and imaging results will be communicated to you by MyChart, letter or phone within 4 business days after the clinic has received the results. If you do not hear from us within 7 days, please contact the clinic through MyChart or phone. If you have a critical or abnormal lab result, we will notify you by phone as soon as possible.  Submit refill requests through [employer] or call your pharmacy and they will forward the refill request to us. Please allow 3 business days for your refill to be completed.          Additional Information About Your Visit       "  MyChart Information     [employer] lets you send messages to your doctor, view your test results, renew your prescriptions, schedule appointments and more. To sign up, go to [email or web address] . Click on \"Log in\" on the left side of the screen, which will take you to the Welcome page. Then click on \"Sign up Now\" on the right side of the page.     You will be asked to enter the access code listed below, as well as some personal information. Please follow the directions to create your username and password.     Your access code is: [email or web address]  Expires: 2018  8:49 AM     Your access code will  in 90 days. If you need help or a new code, please call your [place] clinic or [phone].        Care EveryWhere ID     This is your Care EveryWhere ID. This could be used by other organizations to access your [place] medical records  [id number]        Your Vitals Were     Pulse Temperature Respirations Pulse Oximetry BMI (Body Mass Index)       60 99.2  F (37.3  C) (Oral) 12 99% 33.78 kg/m2        Blood Pressure from Last 3 Encounters:   18 100/72   18 147/89   18 116/62    Weight from Last 3 Encounters:   18 203 lb (92.1 kg)   18 200 lb 12.8 oz (91.1 kg)   18 202 lb 9.6 oz (91.9 kg)              We Performed the Following     TDAP, IM (10 - 64 YRS) - Adacel          Today's Medication Changes          These changes are accurate as of 18  8:20 PM.  If you have any questions, ask your nurse or doctor.               Start taking these medicines.        Dose/Directions    acetaminophen 325 MG tablet   Commonly known as:  TYLENOL   Used for:  Pain of finger of right hand   Started by:  Marija Perales MD        Dose:  650 mg   Take 2 tablets (650 mg) by mouth once for 1 dose   Quantity:  2 tablet   Refills:  0       ondansetron 4 MG tablet   Commonly known as:  ZOFRAN   Used for:  Nausea   Started by:  Marija Perales MD        Dose:  4 mg   Take 1 tablet (4 mg) by " mouth once for 1 dose   Quantity:  1 tablet   Refills:  0            Where to get your medicines      Some of these will need a paper prescription and others can be bought over the counter.  Ask your nurse if you have questions.     You don't need a prescription for these medications     acetaminophen 325 MG tablet    ondansetron 4 MG tablet                Primary Care Provider Fax #    Physician No Ref-Primary [phone]       No address on file        Equal Access to Services     [facility] : Hadii aad ku hadasho Soomaali, waaxda luqadaha, qaybta kaalmada adeegyada, waxay idiin hayantonn aderocio gómez laAndrygary . So [facility] [phone].    ATENCIÓN: Si habla español, tiene a harrison disposición servicios gratuitos de asistencia lingüística. Llame al [phone].    We comply with applicable federal civil rights laws and [place] laws. We do not discriminate on the basis of race, color, national origin, age, disability, sex, sexual orientation, or gender identity.            Thank you!     Thank you for choosing [facility]  for your care. Our goal is always to provide you with excellent care. Hearing back from our patients is one way we can continue to improve our services. Please take a few minutes to complete the written survey that you may receive in the mail after your visit with us. Thank you!             Your Updated Medication List - Protect others around you: Learn how to safely use, store and throw away your medicines at www.disposemymeds.org.          This list is accurate as of 7/19/18  8:20 PM.  Always use your most recent med list.                   Brand Name Dispense Instructions for use Diagnosis    acetaminophen 325 MG tablet    TYLENOL    2 tablet    Take 2 tablets (650 mg) by mouth once for 1 dose    Pain of finger of right hand       amoxicillin-clavulanate 875-125 MG per tablet    AUGMENTIN    20 tablet    Take 1 tablet by mouth 2 times daily for 10 days        BLINK  TEARS 0.25 % Soln ophthalmic solution   Generic drug:  polyethylene glycol 400      Place 2 drops into both eyes 2 times daily as needed for dry eyes        ciprofloxacin-dexamethasone otic suspension    CIPRODEX    4 mL    Place 4 drops Into the left ear 2 times daily for 10 days        Cranberry 500 MG Caps      Take 1 capsule by mouth daily        gabapentin 300 MG capsule    NEURONTIN    42 capsule    Take 300 mg daily for one week, then 300 mg twice daily for one week, then 300 mg TID.        guaiFENesin 600 MG 12 hr tablet    MUCINEX     Take 600 mg by mouth 2 times daily        ibuprofen 600 MG tablet    ADVIL/MOTRIN    60 tablet    Take 1 tablet (600 mg) by mouth every 6 hours as needed for other (mild pain)    Acute right-sided low back pain without sciatica       lactobacillus rhamnosus (GG) capsule     30 capsule    Take 1 capsule by mouth 2 times daily    Acute pyelonephritis       levothyroxine 100 MCG tablet    SYNTHROID/LEVOTHROID     Take 100 mcg by mouth daily        loratadine 10 MG tablet    CLARITIN     Take 10 mg by mouth daily        multivitamin, therapeutic with minerals Tabs tablet      Take 1 tablet by mouth daily        ondansetron 4 MG tablet    ZOFRAN    1 tablet    Take 1 tablet (4 mg) by mouth once for 1 dose    Nausea       tobramycin-dexamethasone 0.3-0.1 % ophthalmic susp    TOBRADEX    1 Bottle    Place 1 drop Into the left eye every 4 hours (while awake) for 7 days        traMADol 50 MG tablet    ULTRAM    10 tablet    Take 1 tablet (50 mg) by mouth every 6 hours as needed for moderate pain    Acute pyelonephritis       VITAMIN D-3 PO      Take 1,000 Units by mouth daily

## 2018-07-20 NOTE — PATIENT INSTRUCTIONS
Extremity Laceration: Skin Glue  A laceration is a cut through the skin. You have a laceration that has been closed with skin glue. This is used on cuts that have smooth edges and are not infected. It's best used on straight, clean cuts on areas that do not get a lot of tension.  You may need a tetanus shot. This is given if you have no record of a shot, and the object that caused the cut may lead to tetanus.  Home care    Your healthcare provider may prescribe an antibiotic. This is to help prevent infection. Follow all instructions for taking this medicine. Take the medicine every day until it is gone or you are told to stop. You should not have any left over.    The healthcare provider may prescribe medicines for pain. Follow instructions for taking them.    Follow the healthcare provider s instructions on how to care for the cut.    No bandage is needed. Skin glue peels off on its own within 5 to 10 days. Most skin wounds heal within 10 days.    Keep the wound clean. You may shower or bathe as usual, but do not use soaps, lotions, or ointments on the wound area. Do not scrub the wound. After bathing, pat the wound dry with a soft towel.    Don't scratch, rub, or pick at the film. Don't place tape directly over the film.    Don't put liquids such as peroxide, ointments, or creams on the wound while the skin glue is in place. Many oil based products can weaken and dissolve the glue.    Don't do any activities that may reinjure your wound.    Don't do any activities that cause heavy sweating. Protect the wound from sunlight.    Most skin wounds heal without problems. But an infection sometimes occurs even with proper treatment. Watch for the signs of infection listed below.  Follow-up care  Follow up as directed with your healthcare provider, or as advised.  When to seek medical advice  Call your healthcare provider right away if any of these occur:    Wound bleeding not controlled by direct pressure    Signs of  infection, including increasing pain in the wound, increasing wound redness or swelling, or pus or bad odor coming from the wound    Fever of 100.4 F (38. C) or higher, or as directed by your healthcare provider    Wound edges reopen    Wound changes colors    Numbness around the wound     Decreased movement around the injured area  Date Last Reviewed: 7/1/2017 2000-2017 [employer]. [address] [place] [address]. All rights reserved. This information is not intended as a substitute for professional medical care. Always follow your healthcare professional's instructions.

## 2018-07-20 NOTE — PROGRESS NOTES
SUBJECTIVE:     Chief Complaint   Patient presents with     Laceration     Pt states laceration on right hand, middle finger      Lilibeth Santizo is a 33 year old female who presents to the clinic with a laceration on the right finger sustained 3 hour(s) ago.  This is a non-work related and accidental injury.    Mechanism of injury: blade from the  .    Associated symptoms: Denies numbness, weakness, or loss of function  Last tetanus booster within 10 years: yes    EXAM:   The patient appears today in alert,no apparent distress distress, she felt slightly nauseous which is something she has when she is in pain   VITALS: /72  Pulse 60  Temp 99.2  F (37.3  C) (Oral)  Resp 12  Wt 203 lb (92.1 kg)  SpO2 99%  BMI 33.78 kg/m2    Size of laceration: 1 centimeters located on a palmar aspect of the distal phalanx of the right middle finger   Characteristics of the laceration: bleeding- mild, its a flap kind of lac   Tendon function intact:yes  Sensation to light touch intact: yes   Pulses intact:yes  Picture included in patient's chart: no    Assessment:  Lilibeth was seen today for laceration.    Diagnoses and all orders for this visit:    Laceration of right middle finger without foreign body, nail damage status unspecified, initial encounter  -     TDAP, IM (10 - 64 YRS) - Adacel    Nausea  -     ondansetron (ZOFRAN) 4 MG tablet; Take 1 tablet (4 mg) by mouth once for 1 dose    Pain of finger of right hand  -     acetaminophen (TYLENOL) 325 MG tablet; Take 2 tablets (650 mg) by mouth once for 1 dose          PLAN:  PROCEDURE NOTE::    Wound cleaned with HIBICLENS  Dermabond was applied  After care instructions:  Keep wound clean and dry for the next 24-48 hours  May return to work as long as wound is kept clean and dry  Discussed the probability of scarring  A splint was dispensed and advised to wear it till tomorrow morning and then take it off

## 2018-09-09 ENCOUNTER — HOSPITAL ENCOUNTER (EMERGENCY)
Facility: CLINIC | Age: 33
Discharge: HOME | End: 2018-09-10
Attending: EMERGENCY MEDICINE | Admitting: EMERGENCY MEDICINE
Payer: COMMERCIAL

## 2018-09-09 VITALS
DIASTOLIC BLOOD PRESSURE: 85 MMHG | WEIGHT: 200 LBS | HEIGHT: 65 IN | BODY MASS INDEX: 33.32 KG/M2 | TEMPERATURE: 98.7 F | RESPIRATION RATE: 16 BRPM | HEART RATE: 72 BPM | OXYGEN SATURATION: 99 % | SYSTOLIC BLOOD PRESSURE: 137 MMHG

## 2018-09-09 DIAGNOSIS — R10.31 RIGHT LOWER QUADRANT ABDOMINAL PAIN: ICD-10-CM

## 2018-09-09 LAB
ALBUMIN SERPL-MCNC: 3.6 G/DL (ref 3.4–5)
ALBUMIN UR-MCNC: NEGATIVE MG/DL
ALP SERPL-CCNC: 68 U/L (ref 40–150)
ALT SERPL W P-5'-P-CCNC: 20 U/L (ref 0–50)
ANION GAP SERPL CALCULATED.3IONS-SCNC: 10 MMOL/L (ref 3–14)
APPEARANCE UR: CLEAR
AST SERPL W P-5'-P-CCNC: 21 U/L (ref 0–45)
BACTERIA #/AREA URNS HPF: ABNORMAL /HPF
BASOPHILS # BLD AUTO: 0 10E9/L (ref 0–0.2)
BASOPHILS NFR BLD AUTO: 0.7 %
BILIRUB SERPL-MCNC: 0.3 MG/DL (ref 0.2–1.3)
BILIRUB UR QL STRIP: NEGATIVE
BUN SERPL-MCNC: 14 MG/DL (ref 7–30)
CALCIUM SERPL-MCNC: 7.8 MG/DL (ref 8.5–10.1)
CHLORIDE SERPL-SCNC: 102 MMOL/L (ref 94–109)
CO2 SERPL-SCNC: 26 MMOL/L (ref 20–32)
COLOR UR AUTO: ABNORMAL
CREAT SERPL-MCNC: 0.93 MG/DL (ref 0.52–1.04)
DIFFERENTIAL METHOD BLD: ABNORMAL
EOSINOPHIL # BLD AUTO: 0.2 10E9/L (ref 0–0.7)
EOSINOPHIL NFR BLD AUTO: 2.6 %
ERYTHROCYTE [DISTWIDTH] IN BLOOD BY AUTOMATED COUNT: 12.7 % (ref 10–15)
GFR SERPL CREATININE-BSD FRML MDRD: 69 ML/MIN/1.7M2
GLUCOSE SERPL-MCNC: 84 MG/DL (ref 70–99)
GLUCOSE UR STRIP-MCNC: NEGATIVE MG/DL
HCG SERPL QL: NEGATIVE
HCT VFR BLD AUTO: 34.9 % (ref 35–47)
HGB BLD-MCNC: 11.6 G/DL (ref 11.7–15.7)
HGB UR QL STRIP: NEGATIVE
IMM GRANULOCYTES # BLD: 0 10E9/L (ref 0–0.4)
IMM GRANULOCYTES NFR BLD: 0.2 %
KETONES UR STRIP-MCNC: NEGATIVE MG/DL
LEUKOCYTE ESTERASE UR QL STRIP: NEGATIVE
LYMPHOCYTES # BLD AUTO: 2.4 10E9/L (ref 0.8–5.3)
LYMPHOCYTES NFR BLD AUTO: 39.8 %
MCH RBC QN AUTO: 30 PG (ref 26.5–33)
MCHC RBC AUTO-ENTMCNC: 33.2 G/DL (ref 31.5–36.5)
MCV RBC AUTO: 90 FL (ref 78–100)
MONOCYTES # BLD AUTO: 0.8 10E9/L (ref 0–1.3)
MONOCYTES NFR BLD AUTO: 12.6 %
MUCOUS THREADS #/AREA URNS LPF: PRESENT /LPF
NEUTROPHILS # BLD AUTO: 2.7 10E9/L (ref 1.6–8.3)
NEUTROPHILS NFR BLD AUTO: 44.1 %
NITRATE UR QL: NEGATIVE
NRBC # BLD AUTO: 0 10*3/UL
NRBC BLD AUTO-RTO: 0 /100
PH UR STRIP: 6 PH (ref 5–7)
PLATELET # BLD AUTO: 255 10E9/L (ref 150–450)
POTASSIUM SERPL-SCNC: 3.5 MMOL/L (ref 3.4–5.3)
PROT SERPL-MCNC: 8 G/DL (ref 6.8–8.8)
RBC # BLD AUTO: 3.87 10E12/L (ref 3.8–5.2)
RBC #/AREA URNS AUTO: 5 /HPF (ref 0–2)
SODIUM SERPL-SCNC: 138 MMOL/L (ref 133–144)
SOURCE: ABNORMAL
SP GR UR STRIP: 1.01 (ref 1–1.03)
SQUAMOUS #/AREA URNS AUTO: 2 /HPF (ref 0–1)
UROBILINOGEN UR STRIP-MCNC: NORMAL MG/DL (ref 0–2)
WBC # BLD AUTO: 6.1 10E9/L (ref 4–11)
WBC #/AREA URNS AUTO: 2 /HPF (ref 0–5)

## 2018-09-09 PROCEDURE — 25000128 H RX IP 250 OP 636: Performed by: EMERGENCY MEDICINE

## 2018-09-09 PROCEDURE — 96361 HYDRATE IV INFUSION ADD-ON: CPT

## 2018-09-09 PROCEDURE — 85025 COMPLETE CBC W/AUTO DIFF WBC: CPT | Performed by: EMERGENCY MEDICINE

## 2018-09-09 PROCEDURE — 84703 CHORIONIC GONADOTROPIN ASSAY: CPT | Performed by: EMERGENCY MEDICINE

## 2018-09-09 PROCEDURE — 80053 COMPREHEN METABOLIC PANEL: CPT | Performed by: EMERGENCY MEDICINE

## 2018-09-09 PROCEDURE — 96375 TX/PRO/DX INJ NEW DRUG ADDON: CPT

## 2018-09-09 PROCEDURE — 99284 EMERGENCY DEPT VISIT MOD MDM: CPT | Mod: 25

## 2018-09-09 PROCEDURE — 96374 THER/PROPH/DIAG INJ IV PUSH: CPT

## 2018-09-09 PROCEDURE — 81001 URINALYSIS AUTO W/SCOPE: CPT | Performed by: EMERGENCY MEDICINE

## 2018-09-09 RX ORDER — ONDANSETRON 2 MG/ML
4 INJECTION INTRAMUSCULAR; INTRAVENOUS EVERY 30 MIN PRN
Status: DISCONTINUED | OUTPATIENT
Start: 2018-09-09 | End: 2018-09-10 | Stop reason: HOSPADM

## 2018-09-09 RX ORDER — KETOROLAC TROMETHAMINE 15 MG/ML
15 INJECTION, SOLUTION INTRAMUSCULAR; INTRAVENOUS ONCE
Status: COMPLETED | OUTPATIENT
Start: 2018-09-09 | End: 2018-09-09

## 2018-09-09 RX ADMIN — SODIUM CHLORIDE 1000 ML: 9 INJECTION, SOLUTION INTRAVENOUS at 21:48

## 2018-09-09 RX ADMIN — ONDANSETRON 4 MG: 2 INJECTION INTRAMUSCULAR; INTRAVENOUS at 21:51

## 2018-09-09 RX ADMIN — KETOROLAC TROMETHAMINE 15 MG: 15 INJECTION, SOLUTION INTRAMUSCULAR; INTRAVENOUS at 22:09

## 2018-09-09 ASSESSMENT — ENCOUNTER SYMPTOMS
FREQUENCY: 0
ABDOMINAL PAIN: 1
NAUSEA: 1
VOMITING: 0
DYSURIA: 0

## 2018-09-09 NOTE — ED AVS SNAPSHOT
Emergency Department    [address] [place] [address]    Phone:  [phone]    Fax:  [phone]                                       Lilibeth Santizo   MRN: [id number]    Department:   Emergency Department   Date of Visit:  9/9/2018           After Visit Summary Signature Page     I have received my discharge instructions, and my questions have been answered. I have discussed any challenges I see with this plan with the nurse or doctor.    ..........................................................................................................................................  Patient/Patient Representative Signature      ..........................................................................................................................................  Patient Representative Print Name and Relationship to Patient    ..................................................               ................................................  Date                                            Time    ..........................................................................................................................................  Reviewed by Signature/Title    ...................................................              ..............................................  Date                                                            Time          22EPIC Rev 08/18

## 2018-09-09 NOTE — ED AVS SNAPSHOT
Emergency Department    [address] [place] [address]    Phone:  [phone]    Fax:  [phone]                                       Lilibeth Santizo   MRN: [id number]    Department:   Emergency Department   Date of Visit:  9/9/2018           Patient Information     Date Of Birth          1985        Your diagnoses for this visit were:     Right lower quadrant abdominal pain        You were seen by Diane Reese MD.      Follow-up Information     Please follow up.    Why:  Call your gyn tomorrow.         Discharge Instructions       Take advil 3 tabs four times a day and tylenol 1000 mg three times a day as needed for pain.   Discharge Instructions  Abdominal Pain    Abdominal pain (belly pain) can be caused by many things. Your evaluation today does not show the exact cause for your pain. Your provider today has decided that it is unlikely your pain is due to a life threatening problem, or a problem requiring surgery or hospital admission. Sometimes those problems cannot be found right away, so it is very important that you follow up as directed.  Sometimes only the changes which occur over time allow the cause of your pain to be found.    Generally, every Emergency Department visit should have a follow-up clinic visit with either a primary or a specialty clinic/provider. Please follow-up as instructed by your emergency provider today. With abdominal pain, we often recommend very close follow-up, such as the following day.    ADULTS:  Return to the Emergency Department right away if:      You get an oral temperature above 102oF or as directed by your provider.    You have blood in your stools. This may be bright red or appear as black, tarry stools.      You keep vomiting (throwing up) or cannot drink liquids.    You see blood when you vomit.     You cannot have a bowel movement or you cannot pass gas.    Your stomach gets bloated or bigger.    Your skin or the whites of  your eyes look yellow.    You faint.    You have bloody, frequent or painful urination (peeing).    You have new symptoms or anything that worries you.    CHILDREN:  Return to the Emergency Department right away if your child has any of the above-listed symptoms or the following:      Pushes your hand away or screams/cries when his/her belly is touched.    You notice your child is very fussy or weak.    Your child is very tired and is too tired to eat or drink.    Your child is dehydrated.  Signs of dehydration can be:  o Significant change in the amount of wet diapers/urine.  o Your infant or child starts to have dry mouth and lips, or no saliva (spit) or tears.    PREGNANT WOMEN:  Return to the Emergency Department right away if you have any of the above-listed symptoms or the following:      You have bleeding, leaking fluid or passing tissue from the vagina.    You have worse pain or cramping, or pain in your shoulder or back.    You have vomiting that will not stop.    You have a temperature of 100oF or more.    Your baby is not moving as much as usual.    You faint.    You get a bad headache with or without eye problems and abdominal pain.    You have a seizure.    You have unusual discharge from your vagina and abdominal pain.    Abdominal pain is pretty common during pregnancy.  Your pain may or may not be related to your pregnancy. You should follow-up closely with your OB provider so they can evaluate you and your baby.  Until you follow-up with your regular provider, do the following:       Avoid sex and do not put anything in your vagina.    Drink clear fluids.    Only take medications approved by your provider.    MORE INFORMATION:    Appendicitis:  A possible cause of abdominal pain in any person who still has their appendix is acute appendicitis. Appendicitis is often hard to diagnose.  Testing does not always rule out early appendicitis or other causes of abdominal pain. Close follow-up with your  "provider and re-evaluations may be needed to figure out the reason for your abdominal pain.    Follow-up:  It is very important that you make an appointment with your clinic and go to the appointment.  If you do not follow-up with your primary provider, it may result in missing an important development which could result in permanent injury or disability and/or lasting pain.  If there is any problem keeping your appointment, call your provider or return to the Emergency Department.    Medications:  Take your medications as directed by your provider today.  Before using over-the-counter medications, ask your provider and make sure to take the medications as directed.  If you have any questions about medications, ask your provider.    Diet:  Resume your normal diet as much as possible, but do not eat fried, fatty or spicy foods while you have pain.  Do not drink alcohol or have caffeine.  Do not smoke tobacco.    Probiotics: If you have been given an antibiotic, you may want to also take a probiotic pill or eat yogurt with live cultures. Probiotics have \"good bacteria\" to help your intestines stay healthy. Studies have shown that probiotics help prevent diarrhea (loose stools) and other intestine problems (including C. diff infection) when you take antibiotics. You can buy these without a prescription in the pharmacy section of the store.     If you were given a prescription for medicine here today, be sure to read all of the information (including the package insert) that comes with your prescription.  This will include important information about the medicine, its side effects, and any warnings that you need to know about.  The pharmacist who fills the prescription can provide more information and answer questions you may have about the medicine.  If you have questions or concerns that the pharmacist cannot address, please call or return to the Emergency Department.       Remember that you can always come back to " the Emergency Department if you are not able to see your regular provider in the amount of time listed above, if you get any new symptoms, or if there is anything that worries you.    24 Hour Appointment Hotline       To make an appointment at any [facility], call [phone] ([phone]). If you don't have a family doctor or clinic, we will help you find one. [facility] are conveniently located to serve the needs of you and your family.             Review of your medicines      Our records show that you are taking the medicines listed below. If these are incorrect, please call your family doctor or clinic.        Dose / Directions Last dose taken    BLINK TEARS 0.25 % Soln ophthalmic solution   Dose:  2 drop   Generic drug:  polyethylene glycol 400        Place 2 drops into both eyes 2 times daily as needed for dry eyes   Refills:  0        Cranberry 500 MG Caps   Dose:  1 capsule        Take 1 capsule by mouth daily   Refills:  0        gabapentin 300 MG capsule   Commonly known as:  NEURONTIN   Quantity:  42 capsule        Take 300 mg daily for one week, then 300 mg twice daily for one week, then 300 mg TID.   Refills:  0        guaiFENesin 600 MG 12 hr tablet   Commonly known as:  MUCINEX   Dose:  600 mg        Take 600 mg by mouth 2 times daily   Refills:  0        ibuprofen 600 MG tablet   Commonly known as:  ADVIL/MOTRIN   Dose:  600 mg   Quantity:  60 tablet        Take 1 tablet (600 mg) by mouth every 6 hours as needed for other (mild pain)   Refills:  0        lactobacillus rhamnosus (GG) capsule   Dose:  1 capsule   Quantity:  30 capsule        Take 1 capsule by mouth 2 times daily   Refills:  0        levothyroxine 100 MCG tablet   Commonly known as:  SYNTHROID/LEVOTHROID   Dose:  100 mcg        Take 100 mcg by mouth daily   Refills:  0        loratadine 10 MG tablet   Commonly known as:  CLARITIN   Dose:  10 mg        Take 10 mg by mouth daily   Refills:  0        multivitamin,  therapeutic with minerals Tabs tablet   Dose:  1 tablet        Take 1 tablet by mouth daily   Refills:  0        traMADol 50 MG tablet   Commonly known as:  ULTRAM   Dose:  50 mg   Quantity:  10 tablet        Take 1 tablet (50 mg) by mouth every 6 hours as needed for moderate pain   Refills:  0        VITAMIN D-3 PO   Dose:  1000 Units        Take 1,000 Units by mouth daily   Refills:  0                Procedures and tests performed during your visit     CBC with platelets differential    Comprehensive metabolic panel    HCG qualitative Blood    UA with Microscopic      Orders Needing Specimen Collection     None      Pending Results     No orders found from 9/7/2018 to 9/10/2018.            Pending Culture Results     No orders found from 9/7/2018 to 9/10/2018.            Pending Results Instructions     If you had any lab results that were not finalized at the time of your Discharge, you can call the ED Lab Result RN at [phone]. You will be contacted by this team for any positive Lab results or changes in treatment. The nurses are available 7 days a week from 10A to 6:30P.  You can leave a message 24 hours per day and they will return your call.        Test Results From Your Hospital Stay        9/9/2018 10:10 PM      Component Results     Component Value Ref Range & Units Status    WBC 6.1 4.0 - 11.0 10e9/L Final    RBC Count 3.87 3.8 - 5.2 10e12/L Final    Hemoglobin 11.6 (L) 11.7 - 15.7 g/dL Final    Hematocrit 34.9 (L) 35.0 - 47.0 % Final    MCV 90 78 - 100 fl Final    MCH 30.0 26.5 - 33.0 pg Final    MCHC 33.2 31.5 - 36.5 g/dL Final    RDW 12.7 10.0 - 15.0 % Final    Platelet Count 255 150 - 450 10e9/L Final    Diff Method Automated Method  Final    % Neutrophils 44.1 % Final    % Lymphocytes 39.8 % Final    % Monocytes 12.6 % Final    % Eosinophils 2.6 % Final    % Basophils 0.7 % Final    % Immature Granulocytes 0.2 % Final    Nucleated RBCs 0 0 /100 Final    Absolute Neutrophil 2.7 1.6 - 8.3 10e9/L  Final    Absolute Lymphocytes 2.4 0.8 - 5.3 10e9/L Final    Absolute Monocytes 0.8 0.0 - 1.3 10e9/L Final    Absolute Eosinophils 0.2 0.0 - 0.7 10e9/L Final    Absolute Basophils 0.0 0.0 - 0.2 10e9/L Final    Abs Immature Granulocytes 0.0 0 - 0.4 10e9/L Final    Absolute Nucleated RBC 0.0  Final         9/9/2018 10:26 PM      Component Results     Component Value Ref Range & Units Status    Sodium 138 133 - 144 mmol/L Final    Potassium 3.5 3.4 - 5.3 mmol/L Final    Chloride 102 94 - 109 mmol/L Final    Carbon Dioxide 26 20 - 32 mmol/L Final    Anion Gap 10 3 - 14 mmol/L Final    Glucose 84 70 - 99 mg/dL Final    Urea Nitrogen 14 7 - 30 mg/dL Final    Creatinine 0.93 0.52 - 1.04 mg/dL Final    GFR Estimate 69 >60 mL/min/1.7m2 Final    Non  GFR Calc    GFR Estimate If Black 83 >60 mL/min/1.7m2 Final    African American GFR Calc    Calcium 7.8 (L) 8.5 - 10.1 mg/dL Final    Bilirubin Total 0.3 0.2 - 1.3 mg/dL Final    Albumin 3.6 3.4 - 5.0 g/dL Final    Protein Total 8.0 6.8 - 8.8 g/dL Final    Alkaline Phosphatase 68 40 - 150 U/L Final    ALT 20 0 - 50 U/L Final    AST 21 0 - 45 U/L Final         9/9/2018 10:35 PM      Component Results     Component Value Ref Range & Units Status    HCG Qualitative Serum Negative NEG^Negative Final    This test is for screening purposes.  Results should be interpreted along with   the clinical picture.  Confirmation testing is available if warranted by   ordering [id number], HCG Quantitative Pregnancy.           9/9/2018 11:33 PM      Component Results     Component Value Ref Range & Units Status    Color Urine Light Yellow  Final    Appearance Urine Clear  Final    Glucose Urine Negative NEG^Negative mg/dL Final    Bilirubin Urine Negative NEG^Negative Final    Ketones Urine Negative NEG^Negative mg/dL Final    Specific Gravity Urine 1.010 1.003 - 1.035 Final    Blood Urine Negative NEG^Negative Final    pH Urine 6.0 5.0 - 7.0 pH Final    Protein Albumin Urine Negative  NEG^Negative mg/dL Final    Urobilinogen mg/dL Normal 0.0 - 2.0 mg/dL Final    Nitrite Urine Negative NEG^Negative Final    Leukocyte Esterase Urine Negative NEG^Negative Final    Source Midstream Urine  Final    WBC Urine 2 0 - 5 /HPF Final    RBC Urine 5 (H) 0 - 2 /HPF Final    Bacteria Urine Few (A) NEG^Negative /HPF Final    Squamous Epithelial /HPF Urine 2 (H) 0 - 1 /HPF Final    Mucous Urine Present (A) NEG^Negative /LPF Final                Clinical Quality Measure: Blood Pressure Screening     Your blood pressure was checked while you were in the emergency department today. The last reading we obtained was  BP: 137/85 . Please read the guidelines below about what these numbers mean and what you should do about them.  If your systolic blood pressure (the top number) is less than 120 and your diastolic blood pressure (the bottom number) is less than 80, then your blood pressure is normal. There is nothing more that you need to do about it.  If your systolic blood pressure (the top number) is 120-139 or your diastolic blood pressure (the bottom number) is 80-89, your blood pressure may be higher than it should be. You should have your blood pressure rechecked within a year by a primary care provider.  If your systolic blood pressure (the top number) is 140 or greater or your diastolic blood pressure (the bottom number) is 90 or greater, you may have high blood pressure. High blood pressure is treatable, but if left untreated over time it can put you at risk for heart attack, stroke, or kidney failure. You should have your blood pressure rechecked by a primary care provider within the next 4 weeks.  If your provider in the emergency department today gave you specific instructions to follow-up with your doctor or provider even sooner than that, you should follow that instruction and not wait for up to 4 weeks for your follow-up visit.        Thank you for choosing [facility]       Thank you for choosing [facility]  "for your care. Our goal is always to provide you with excellent care. Hearing back from our patients is one way we can continue to improve our services. Please take a few minutes to complete the written survey that you may receive in the mail after you visit with us. Thank you!        [employer] Information     [facility] lets you send messages to your doctor, view your test results, renew your prescriptions, schedule appointments and more. To sign up, go to [email or web address] . Click on \"Log in\" on the left side of the screen, which will take you to the Welcome page. Then click on \"Sign up Now\" on the right side of the page.     You will be asked to enter the access code listed below, as well as some personal information. Please follow the directions to create your username and password.     Your access code is: [email or web address]  Expires: 2018 11:49 PM     Your access code will  in 90 days. If you need help or a new code, please call your [facility] clinic or [phone].        Care EveryWhere ID     This is your Care EveryWhere ID. This could be used by other organizations to access your [facility] medical records  [id number]        Equal Access to Services     PADMINI GARZON AH: Tank Adams, chun crenshaw, bree natarajan, nathen champagne. So [facility] [phone].    ATENCIÓN: Si habla español, tiene a harrison disposición servicios gratuitos de asistencia lingüística. Norman al [phone].    We comply with applicable federal civil rights laws and [place] laws. We do not discriminate on the basis of race, color, national origin, age, disability, sex, sexual orientation, or gender identity.            After Visit Summary       This is your record. Keep this with you and show to your community pharmacist(s) and doctor(s) at your next visit.                  "

## 2018-09-10 NOTE — ED NOTES
MD discussed plan of care with pt.    Physical Exam: 


SUBJECTIVE: Patient seen and examined.  Pt slightly hard of hearing.  Pt has no 

complaints.  Pt denies diarrhea, abdominal pain.  No events overnight.  





OBJECTIVE:


 Vital Signs - 24 hr











  10/23/17 10/24/17 10/24/17





  22:00 06:22 10:00


 


Temperature 97.6 F 98.2 F 97.9 F


 


Pulse Rate 84 68 75


 


Respiratory 20 20 18





Rate   


 


Blood Pressure 159/66 144/62 145/62


 


O2 Sat by Pulse   92 L





Oximetry (%)   














  10/24/17 10/24/17 10/24/17





  14:49 18:00 20:40


 


Temperature 98.4 F 97.6 F 98.1 F


 


Pulse Rate 80 86 69


 


Respiratory 16 18 16





Rate   


 


Blood Pressure 160/85 104/45 133/89


 


O2 Sat by Pulse   





Oximetry (%)   








GENERAL: The patient is awake, alert, and fully oriented, in no acute distress.


HEAD: Normal with no signs of trauma.


EYES: Extraocular movements intact, sclera anicteric, conjunctiva clear. No 

ptosis. 


ENT: Moist mucous membranes.


NECK: Trachea midline, supple. 


LUNGS: Bibasilar crackles.  No accessory muscle use. 


HEART: Regular rate and rhythm, S1, S2 without murmur, rub or gallop.


ABDOMEN: Soft, nontender, nondistended, no guarding, no rebound, no masses.


EXTREMITIES: Warm, well-perfused, no edema. 


PSYCH: Normal mood, normal affect.


SKIN: Warm, dry, normal turgor, no rashes or lesions noted


 


 Laboratory Results - last 24 hr











  10/24/17 10/24/17





  07:25 07:25


 


WBC   7.7


 


RBC   3.81 L


 


Hgb   11.4 L


 


Hct   33.1 L


 


MCV   86.7


 


MCH   29.9


 


MCHC   34.4


 


RDW   13.9


 


Plt Count   405


 


MPV   6.9 L


 


Neutrophils %   48.5


 


Lymphocytes %   29.8


 


Monocytes %   10.8 H


 


Eosinophils %   9.7 H


 


Basophils %   1.2


 


Sodium  144 


 


Potassium  3.6 


 


Chloride  109 H 


 


Carbon Dioxide  22 


 


Anion Gap  13 


 


BUN  7  D 


 


Creatinine  0.7 


 


Creat Clearance w eGFR  > 60 


 


Random Glucose  94 


 


Calcium  7.8 L 


 


Total Bilirubin  0.5 


 


AST  22  D 


 


ALT  85 H D 


 


Alkaline Phosphatase  239 H 


 


Total Protein  5.7 L 


 


Albumin  2.6 L 








Active Medications











Generic Name Dose Route Start Last Admin





  Trade Name Freq  PRN Reason Stop Dose Admin


 


Amlodipine Besylate  10 mg 10/22/17 10:00 10/24/17 09:16





  Norvasc -  PO   10 mg





  DAILY LAUREN   Administration


 


Artificial Tears  1 drop 10/22/17 06:37 10/23/17 21:33





  Artificial Tears  OU   1 drop





  TID PRN   Administration





  PAIN   


 


Atorvastatin Calcium  20 mg 10/22/17 22:00 10/23/17 21:35





  Lipitor -  PO   20 mg





  HS LAUREN   Administration


 


Carvedilol  12.5 mg 10/22/17 10:00 10/24/17 09:15





  Coreg -  PO   12.5 mg





  BID LAUREN   Administration


 


Cholecalciferol  2,000 unit 10/22/17 10:00 10/24/17 09:16





  Vitamin D3 -  PO   2,000 unit





  DAILY LAUREN   Administration


 


Finasteride  5 mg 10/24/17 22:00  





  Proscar -  PO   





  HS LAUREN   


 


Metronidazole  100 mls @ 100 mls/hr 10/22/17 10:00 10/24/17 17:52





  Flagyl 500mg Premixed Ivpb -  IVPB   100 mls/hr





  Q8H-IV LAUREN   Administration


 


CEFTRIAXONE 1 G/50 ML PREMIX  50 mls @ 100 mls/hr 10/22/17 10:00 10/24/17 09:14





  Ceftriaxone 1 Gm-D5w Bag  IVPB   100 mls/hr





  DAILY LAUREN   Administration


 


Dextrose/Sodium Chloride  1,000 mls @ 125 mls/hr 10/24/17 18:45  





  D5-1/2ns -  IV   





  ASDIR LAUREN   


 


Losartan Potassium  50 mg 10/22/17 10:00 10/24/17 09:24





  Cozaar -  PO   50 mg





  DAILY LAUREN   Administration


 


Meclizine HCl  25 mg 10/22/17 06:37  





  Antivert -  PO   





  Q8H PRN   





  dizziness   


 


Melatonin  5 mg 10/22/17 22:00 10/23/17 23:05





  Melatonin  PO   5 mg





  HS LAUREN   Administration


 


Omega-3-Acid Ethyl Esters  1 gm 10/22/17 10:00 10/24/17 09:24





  Lovaza -  PO   1 gm





  DAILY LAUREN   Administration


 


Oxycodone HCl  5 mg 10/22/17 06:37  





  Roxicodone -  PO   





  Q4H PRN   





  PAIN   


 


Ranitidine HCl  150 mg 10/24/17 22:00  





  Zantac -  PO   





  HS LAUREN   


 


Simethicone  80 mg 10/22/17 06:37  





  Mylicon -  PO   





  Q4H PRN   





  INDIGESTION   


 


Tamsulosin HCl  0.4 mg 10/24/17 22:00  





  Flomax -  PO   





  HS LAUREN   








IMAGING:


10/23/17 MRI Abdomen -> s/p cholecystectomy.  Choledocholithiasis.  





ASSESSMENT/PLAN:


82yo M with PMH of obstruction and ischemic colitis, CHF, HTN, HLD, DIC, 

presents with diarrhea s/p constipation and found to have thickening of Left 

colon and sigmoid colon walls on CT.





1) abdominal pain


   - choledocholithiasis found on MRCP


      - GI Consult -> consider ERCP


   - stool culture (-)


   - c diff (-)


   - LFTs improving, continue to monitor


   - continue Ceftriaxone and Flagyl


   - continue Oxycodone 5mg po q4hr prn for pain





2) htn


   - continue Norvasc, Coreg, Cozaar





3) hld


   - continue Lipitor 





4) FEN


   - Fluids: D5 1/2NS D/Zackary 2/2 bibasilar crackles


   - Electrolytes: wnl, continue to monitor


   - Nutrition: clear liquid diet





5) Prophylaxis


   - DVT ppx with heparin 5,000U SQ q8hr


   - deconditioning ppx with PT consult





Visit type





- Emergency Visit


Emergency Visit: Yes


ED Registration Date: 10/22/17


Care time: The patient presented to the Emergency Department on the above date 

and was hospitalized for further evaluation of their emergent condition.





- New Patient


This patient is new to me today: Yes


Date on this admission: 10/24/17





- Critical Care


Critical Care patient: No

## 2018-09-10 NOTE — ED PROVIDER NOTES
History     Chief Complaint:    Abdominal pain      HPI   Lilibeth Santizo is a 33 year old female with a history of hypothyroidism and mastoiditis who presents to the ED for evaluation of abdominal pain. The patient states that she developed right lower quadrant abdominal pain today around noon in conjunction with intermittent nausea. She states that her abdominal pain persisted intermittently throughout the earlier afternoon, however, later in the day both her abdominal pain and nausea became more constant. About an hour prior to presenting, she states that he abdominal pain was as high as a 10/10 and radiated to her left side, however, she currently rates it a 7/10. She denies any symptoms of vomit, dysuria, urinary frequency, or vaginal discharge. She notes that she has not had a bowel movement today which is atypical. She denies any history of ovarian cysts. Of note, however, she states that her current symptoms are similar to symptoms she had prior to having procedure in which she had endometriosis removed in 2016. The patient states that her last menstrual period was a week and a half ago; patient is not at risk for pregnancy. She took Zofran from an old prescription for her nausea and ibuprofen for her abdominal pain with no relief of her symptoms. Care everywhere review indicates history of drug seeking behavior as noted below.     Subjective 17 ([facility]):   Lilibeth Santizo is a 32 y.o. female established patient.  3 para 2. Last menstrual period 2017. Patient is seen emergently in a self-referral for consultation on her right lower quadrant pain. The patient has had chronic abdominal pain/dysmenorrhea. She had imaging with ultrasound and a CAT scan this year. Patient had seen Dr. Ginger Vásquez on the  and . The patient is scheduled for diagnostic laparoscopy on December 15. The patient was asked to get a gastrointestinal consultation. Dr. Yunior Kraft  did that today. [facility] with right lower quadrant pain and asking for medication. The patient said she was told not to take nonsteroidal anti-medications from this point forward because of her surgery. She requests Percocet. Patient was seen in the emergency department in November. She was given Percocet then. Patient said she had right lower quadrant discomfort over the weekend. She took a pain pill. She threw it up. She said she has ran out of narcotics. She said she spent some day on the couch. Dr. Vásquez did not provide any medication on November 30.    I reviewed the [place] pharmacology prescribing website. The patient was found to get prescriptions of narcotic April 4, May 29, June 7, July 24 as well as November 16. I asked the patient said that these medications. She said that those are not true. She does not remember getting medicines at that time.    Patient had dysmenorrhea. In 2016 she had a hysteroscopy and polyp removal. The pain did not improve. In the spring of 2017 she had a pelvic sonogram that was normal. Patient persisted in having pelvic pain. She was seen in November 2017 given a CAT scan to rule out appendicitis. The recommendation of the gastroenterologist today suggest that a flexible sigmoidoscopy or colonoscopy is warranted after the diagnostic laparoscopy    Patient says her pain is right-sided. She says shoots down to the top of her thigh. It may have a component of the hip area.    Patient says she is under stress. Her  had a kidney transplant this year. She denies having other narcotic medication used in the spring of 2017.    Plan 12/5/17:     I discussed with the patient the nature of her chronic abdominal pelvic pain. I think it is prudent to have a diagnostic laparoscopy and possibly a colonoscopy. I felt the patient was defensive and down right dishonest when she denied that any of the reported visits in the spring of 2017 that were  "associated with pain medicine. She mentioned that she needs pain medicine to get to her surgery. She does not want nonsteroidal medication even though she was prescribed this before because she was told not to have that prior to her surgery. I mentioned to the patient that the diagnostic laparoscopy for pelvic pain has a 40% chance of no gynecologic pathology. I feel it is reasonable to continue to investigate her discomfort but just like in 2016 with her hysteroscope and D&C the pain may not be alleviated after the surgery. I will not give any additional pain medication for Tori from the 10 tablets Percocet I gave her now. I also mentioned that I will not refill any pain medication for her in the future. She should go with Dr. Ginger Vásquez and discuss her chronic abdominal pelvic pain with her. I mentioned about the [place] physicians reporting database. My prescription will be another david on her chart. If this workup from gynecology and gastroenterology is negative she should seriously consider pain management with a chronic pain clinic like [facility] or [facility]        Allergies:  Cefixime  Penicillins  Suprax     Medications:    Gabapentin  Levothyroxine     Past Medical History:    Mastoiditis  Hypothyroidism    Past Surgical History:    Multiple ear surgery  Ankle surgery  C/S  Laparoscopic tubal ligation    Family History:    No past pertinent family history.    Social History:  Negative for tobacco use.  Positive for alcohol use (occasional).  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  Dr. Vásquez OB at [facility]  Marital Status:   [2]       Review of Systems   Gastrointestinal: Positive for abdominal pain and nausea. Negative for vomiting.   Genitourinary: Negative for dysuria, frequency and vaginal discharge.   All other systems reviewed and are negative.      Physical Exam   First Vitals:  BP: 137/85  Pulse: 72  Temp: 98.7  F (37.1  C)  Resp: 16  Height: 165.1 cm (5' 5\")  Weight: 90.7 kg " (200 lb)  SpO2: 99 %      Physical Exam  Constitutional:  Oriented to person, place, and time.      Appears well-developed and well-nourished.   HENT:   Head:    Normocephalic and atraumatic.   Right Ear:   Tympanic membrane and external ear normal.   Left Ear:   Tympanic membrane and external ear normal.   Mouth/Throat:   Oropharynx is clear and moist.      Mucous membranes are normal.   Eyes:    Conjunctivae normal and EOM are normal.      Pupils are equal, round, and reactive to light.   Neck:    Normal range of motion. Neck supple.   Cardiovascular:  Normal rate, regular rhythm, S1 normal and S2 normal.      No gallop and no friction rub. No murmur heard.  Pulmonary/Chest:  Breath sounds normal. No respiratory distress.      No wheezes. No rhonchi. No rales.   Abdominal:   Soft. No hepatosplenomegaly. No tenderness.      No rebound and no CVA tenderness.      Tender low in  right groin over inguinal crease. . No abdominal tenderness.  Musculoskeletal:  Normal range of motion.   Neurological:   Alert and oriented to person, place, and time. Normal strength.      GCS eye subscore is 4. GCS verbal subscore is 5.      GCS motor subscore is 6.   Skin:    Skin is warm and dry.   Psychiatric:   Normal mood and affect.      Speech is normal and behavior is normal.      Judgment and thought content normal.      Cognition and memory are normal.       Emergency Department Course   Laboratory:  CBC: WBC: 6.1, HGB: 11.6 (L), PLT: 255  CMP: Glucose 84, Calcium 7.8 (L), o/w WNL (Creatinine: 0.93)    HCG: negative  UA with Microscopic: RBC/HPF 5 (H), Bacteria Few (A), Squamous epithelial/HPF 2 (H), Mucous present, o/w WNL      Interventions:  2148 NS 1,000 mLs IV  2151 Zofran 4 mg IV  2209 Toradol 15 mg IV      Emergency Department Course:  Nursing notes and vitals reviewed. (4789) I performed an exam of the patient as documented above.     The patient provided a urine sample here in the emergency department. This was sent for  laboratory testing, findings above.     IV inserted. Medicine administered as documented above. Blood drawn. This was sent to the lab for further testing, results above.     2339 I rechecked the patient and discussed the results of her workup thus far.     Findings and plan explained to the Patient. Patient discharged home with instructions regarding supportive care, medications, and reasons to return. The importance of close follow-up was reviewed.     I personally reviewed the laboratory results with the Patient and answered all related questions prior to discharge.     Impression & Plan    Medical Decision Making:  Patient is a 33-year-old female who comes in complaining of acute right groin pain.  She is concerned she could have appendicitis.  On further questioning she does admit to having pain like this similarly in the past which is thought to be due to endometriosis and for which she had surgery late last year.  After that she said it was better but now has been recurrent.  I reviewed those records and noted description of the same pain seems to be the same.  Is very low in the inguinal crease.  She does not have any tenderness in the area of where the appendix would usually be does not have a fever, elevated white count.  She does not have any blood in her urine to suggest as a kidney stone.  Of note is note above which is expressed a concern over patient's pain medication use.  She is not give any narcotics here will be sent home to use Tylenol ibuprofen as needed.  She was advised to call her GYN tomorrow to discuss that her pain has returned.  She can return sooner if worse.  Critical Care time:  none    Diagnosis:    ICD-10-CM    1. Right lower quadrant abdominal pain R10.31        Disposition:  discharged to home    Scribe Disclosure:  I,  Feliz Webber, am serving as a scribe on 9/9/2018 at 10:35 PM to personally document services performed by Diane Reese MD based on my observations and the  provider's statements to me.            Feliz Webber  9/9/2018    EMERGENCY DEPARTMENT       Diane Reese MD  09/10/18 194

## 2018-09-10 NOTE — DISCHARGE INSTRUCTIONS
Take advil 3 tabs four times a day and tylenol 1000 mg three times a day as needed for pain.   Discharge Instructions  Abdominal Pain    Abdominal pain (belly pain) can be caused by many things. Your evaluation today does not show the exact cause for your pain. Your provider today has decided that it is unlikely your pain is due to a life threatening problem, or a problem requiring surgery or hospital admission. Sometimes those problems cannot be found right away, so it is very important that you follow up as directed.  Sometimes only the changes which occur over time allow the cause of your pain to be found.    Generally, every Emergency Department visit should have a follow-up clinic visit with either a primary or a specialty clinic/provider. Please follow-up as instructed by your emergency provider today. With abdominal pain, we often recommend very close follow-up, such as the following day.    ADULTS:  Return to the Emergency Department right away if:      You get an oral temperature above 102oF or as directed by your provider.    You have blood in your stools. This may be bright red or appear as black, tarry stools.      You keep vomiting (throwing up) or cannot drink liquids.    You see blood when you vomit.     You cannot have a bowel movement or you cannot pass gas.    Your stomach gets bloated or bigger.    Your skin or the whites of your eyes look yellow.    You faint.    You have bloody, frequent or painful urination (peeing).    You have new symptoms or anything that worries you.    CHILDREN:  Return to the Emergency Department right away if your child has any of the above-listed symptoms or the following:      Pushes your hand away or screams/cries when his/her belly is touched.    You notice your child is very fussy or weak.    Your child is very tired and is too tired to eat or drink.    Your child is dehydrated.  Signs of dehydration can be:  o Significant change in the amount of wet  diapers/urine.  o Your infant or child starts to have dry mouth and lips, or no saliva (spit) or tears.    PREGNANT WOMEN:  Return to the Emergency Department right away if you have any of the above-listed symptoms or the following:      You have bleeding, leaking fluid or passing tissue from the vagina.    You have worse pain or cramping, or pain in your shoulder or back.    You have vomiting that will not stop.    You have a temperature of 100oF or more.    Your baby is not moving as much as usual.    You faint.    You get a bad headache with or without eye problems and abdominal pain.    You have a seizure.    You have unusual discharge from your vagina and abdominal pain.    Abdominal pain is pretty common during pregnancy.  Your pain may or may not be related to your pregnancy. You should follow-up closely with your OB provider so they can evaluate you and your baby.  Until you follow-up with your regular provider, do the following:       Avoid sex and do not put anything in your vagina.    Drink clear fluids.    Only take medications approved by your provider.    MORE INFORMATION:    Appendicitis:  A possible cause of abdominal pain in any person who still has their appendix is acute appendicitis. Appendicitis is often hard to diagnose.  Testing does not always rule out early appendicitis or other causes of abdominal pain. Close follow-up with your provider and re-evaluations may be needed to figure out the reason for your abdominal pain.    Follow-up:  It is very important that you make an appointment with your clinic and go to the appointment.  If you do not follow-up with your primary provider, it may result in missing an important development which could result in permanent injury or disability and/or lasting pain.  If there is any problem keeping your appointment, call your provider or return to the Emergency Department.    Medications:  Take your medications as directed by your provider today.  Before  "using over-the-counter medications, ask your provider and make sure to take the medications as directed.  If you have any questions about medications, ask your provider.    Diet:  Resume your normal diet as much as possible, but do not eat fried, fatty or spicy foods while you have pain.  Do not drink alcohol or have caffeine.  Do not smoke tobacco.    Probiotics: If you have been given an antibiotic, you may want to also take a probiotic pill or eat yogurt with live cultures. Probiotics have \"good bacteria\" to help your intestines stay healthy. Studies have shown that probiotics help prevent diarrhea (loose stools) and other intestine problems (including C. diff infection) when you take antibiotics. You can buy these without a prescription in the pharmacy section of the store.     If you were given a prescription for medicine here today, be sure to read all of the information (including the package insert) that comes with your prescription.  This will include important information about the medicine, its side effects, and any warnings that you need to know about.  The pharmacist who fills the prescription can provide more information and answer questions you may have about the medicine.  If you have questions or concerns that the pharmacist cannot address, please call or return to the Emergency Department.       Remember that you can always come back to the Emergency Department if you are not able to see your regular provider in the amount of time listed above, if you get any new symptoms, or if there is anything that worries you.  "

## 2018-12-13 ENCOUNTER — HOSPITAL ENCOUNTER (EMERGENCY)
Facility: CLINIC | Age: 33
Discharge: HOME | End: 2018-12-13
Attending: NURSE PRACTITIONER | Admitting: NURSE PRACTITIONER
Payer: COMMERCIAL

## 2018-12-13 ENCOUNTER — APPOINTMENT (OUTPATIENT)
Dept: CT IMAGING | Facility: CLINIC | Age: 33
End: 2018-12-13
Attending: NURSE PRACTITIONER
Payer: COMMERCIAL

## 2018-12-13 VITALS
HEART RATE: 86 BPM | RESPIRATION RATE: 18 BRPM | SYSTOLIC BLOOD PRESSURE: 120 MMHG | DIASTOLIC BLOOD PRESSURE: 76 MMHG | BODY MASS INDEX: 33.66 KG/M2 | HEIGHT: 65 IN | OXYGEN SATURATION: 100 % | TEMPERATURE: 98.2 F | WEIGHT: 202 LBS

## 2018-12-13 DIAGNOSIS — Z86.69 HISTORY OF MASTOIDITIS: ICD-10-CM

## 2018-12-13 DIAGNOSIS — H92.02 LEFT EAR PAIN: ICD-10-CM

## 2018-12-13 LAB
ANION GAP SERPL CALCULATED.3IONS-SCNC: 6 MMOL/L (ref 3–14)
BASOPHILS # BLD AUTO: 0 10E9/L (ref 0–0.2)
BASOPHILS NFR BLD AUTO: 0.5 %
BUN SERPL-MCNC: 12 MG/DL (ref 7–30)
CALCIUM SERPL-MCNC: 8 MG/DL (ref 8.5–10.1)
CHLORIDE SERPL-SCNC: 104 MMOL/L (ref 94–109)
CO2 SERPL-SCNC: 25 MMOL/L (ref 20–32)
CREAT SERPL-MCNC: 0.76 MG/DL (ref 0.52–1.04)
DIFFERENTIAL METHOD BLD: NORMAL
EOSINOPHIL # BLD AUTO: 0.1 10E9/L (ref 0–0.7)
EOSINOPHIL NFR BLD AUTO: 1.3 %
ERYTHROCYTE [DISTWIDTH] IN BLOOD BY AUTOMATED COUNT: 13.1 % (ref 10–15)
GFR SERPL CREATININE-BSD FRML MDRD: 87 ML/MIN/1.7M2
GLUCOSE SERPL-MCNC: 79 MG/DL (ref 70–99)
HCG SERPL QL: NEGATIVE
HCT VFR BLD AUTO: 38.4 % (ref 35–47)
HGB BLD-MCNC: 13.1 G/DL (ref 11.7–15.7)
IMM GRANULOCYTES # BLD: 0 10E9/L (ref 0–0.4)
IMM GRANULOCYTES NFR BLD: 0.2 %
LYMPHOCYTES # BLD AUTO: 2.2 10E9/L (ref 0.8–5.3)
LYMPHOCYTES NFR BLD AUTO: 35.1 %
MCH RBC QN AUTO: 30.6 PG (ref 26.5–33)
MCHC RBC AUTO-ENTMCNC: 34.1 G/DL (ref 31.5–36.5)
MCV RBC AUTO: 90 FL (ref 78–100)
MONOCYTES # BLD AUTO: 0.6 10E9/L (ref 0–1.3)
MONOCYTES NFR BLD AUTO: 10 %
NEUTROPHILS # BLD AUTO: 3.2 10E9/L (ref 1.6–8.3)
NEUTROPHILS NFR BLD AUTO: 52.9 %
NRBC # BLD AUTO: 0 10*3/UL
NRBC BLD AUTO-RTO: 0 /100
PLATELET # BLD AUTO: 284 10E9/L (ref 150–450)
POTASSIUM SERPL-SCNC: 3.5 MMOL/L (ref 3.4–5.3)
RBC # BLD AUTO: 4.28 10E12/L (ref 3.8–5.2)
SODIUM SERPL-SCNC: 135 MMOL/L (ref 133–144)
WBC # BLD AUTO: 6.1 10E9/L (ref 4–11)

## 2018-12-13 PROCEDURE — 25000132 ZZH RX MED GY IP 250 OP 250 PS 637: Performed by: NURSE PRACTITIONER

## 2018-12-13 PROCEDURE — 99285 EMERGENCY DEPT VISIT HI MDM: CPT | Mod: 25

## 2018-12-13 PROCEDURE — 84703 CHORIONIC GONADOTROPIN ASSAY: CPT | Performed by: NURSE PRACTITIONER

## 2018-12-13 PROCEDURE — 25000128 H RX IP 250 OP 636: Performed by: NURSE PRACTITIONER

## 2018-12-13 PROCEDURE — 80048 BASIC METABOLIC PNL TOTAL CA: CPT | Performed by: NURSE PRACTITIONER

## 2018-12-13 PROCEDURE — 96374 THER/PROPH/DIAG INJ IV PUSH: CPT

## 2018-12-13 PROCEDURE — 96361 HYDRATE IV INFUSION ADD-ON: CPT

## 2018-12-13 PROCEDURE — 85025 COMPLETE CBC W/AUTO DIFF WBC: CPT | Performed by: NURSE PRACTITIONER

## 2018-12-13 PROCEDURE — 70480 CT ORBIT/EAR/FOSSA W/O DYE: CPT

## 2018-12-13 RX ORDER — HYDROCODONE BITARTRATE AND ACETAMINOPHEN 5; 325 MG/1; MG/1
1 TABLET ORAL ONCE
Status: COMPLETED | OUTPATIENT
Start: 2018-12-13 | End: 2018-12-13

## 2018-12-13 RX ORDER — HYDROCODONE BITARTRATE AND ACETAMINOPHEN 5; 325 MG/1; MG/1
1 TABLET ORAL EVERY 6 HOURS PRN
Qty: 5 TABLET | Refills: 0 | Status: SHIPPED | OUTPATIENT
Start: 2018-12-13 | End: 2018-12-16

## 2018-12-13 RX ORDER — CIPROFLOXACIN AND DEXAMETHASONE 3; 1 MG/ML; MG/ML
4 SUSPENSION/ DROPS AURICULAR (OTIC) 2 TIMES DAILY
Qty: 2.8 ML | Refills: 0 | Status: SHIPPED | OUTPATIENT
Start: 2018-12-13 | End: 2018-12-20

## 2018-12-13 RX ORDER — ONDANSETRON 2 MG/ML
4 INJECTION INTRAMUSCULAR; INTRAVENOUS ONCE
Status: COMPLETED | OUTPATIENT
Start: 2018-12-13 | End: 2018-12-13

## 2018-12-13 RX ADMIN — SODIUM CHLORIDE 1000 ML: 9 INJECTION, SOLUTION INTRAVENOUS at 18:39

## 2018-12-13 RX ADMIN — HYDROCODONE BITARTRATE AND ACETAMINOPHEN 1 TABLET: 5; 325 TABLET ORAL at 18:38

## 2018-12-13 RX ADMIN — ONDANSETRON 4 MG: 2 INJECTION INTRAMUSCULAR; INTRAVENOUS at 18:38

## 2018-12-13 ASSESSMENT — MIFFLIN-ST. JEOR: SCORE: 1612.15

## 2018-12-13 ASSESSMENT — ENCOUNTER SYMPTOMS
CHILLS: 0
HEADACHES: 1
FEVER: 0

## 2018-12-13 NOTE — ED AVS SNAPSHOT
Emergency Department  [address] [place] [address]  Phone:  [phone]  Fax:  [phone]                                    Lilibeth Santizo   MRN: [id number]    Department:   Emergency Department   Date of Visit:  12/13/2018           After Visit Summary Signature Page    I have received my discharge instructions, and my questions have been answered. I have discussed any challenges I see with this plan with the nurse or doctor.    ..........................................................................................................................................  Patient/Patient Representative Signature      ..........................................................................................................................................  Patient Representative Print Name and Relationship to Patient    ..................................................               ................................................  Date                                   Time    ..........................................................................................................................................  Reviewed by Signature/Title    ...................................................              ..............................................  Date                                               Time          22EPIC Rev 08/18

## 2018-12-13 NOTE — LETTER
December 13, 2018      To Whom It May Concern:      Lilibeth WOLFE Sukhdev was seen in our Emergency Department today, 12/13/18.  Please excuse Lilibeth from work on 12/14/2018 so she may follow-up with her health care provider.  She may return to work when improved.    Sincerely,        Sherry Quinones, CNP

## 2018-12-13 NOTE — ED PROVIDER NOTES
History     Chief Complaint:  Left ear pain    HPI   Lilibeth Santizo is a 33 year old female who presents with left ear pain. The patient states that she has recurrent issues with her ears including mastoiditis as well as recurrent otitis media starting back in March. She has seen ENT previously and has a PE tube in place still in the left ear as well as mastoidectomy on the left. She was treated with Augmentin and Ciprodex drops with resolution. She has since been treated multiple times for this in the past, all with resolution. Two weeks ago, the patient states that she developed a left sided ear pain with associated drainage. This has been ongoing since that time but she has not sought treatment, hoping it would go away. The patient was seen at her PCP in routine follow up for a different concern where she mentioned her left ear pain but only had some fluid behind the ear and was encouraged to increase her gabapentin dose to 3 times a day. She reports that she did that but was still having a sharp, shooting pain in her left ear, radiating down into her neck and across her head causing a headache. In the ear itself she complains of tenderness and some fullness and pressure. She also notes a decreased appetite and lack of oral intake over the past two days and is unsure if this is related to her ear pain. The patient notes taking Gabapentin and Ibuprofen at 1300 with very little relief. She denies fevers, vision changes, hearing changes, headache, neck pain, dizziness, chills, nausea, vomiting, or focal right ear pain.    CT-scan of the head obtained on 3/8/2018 at  Clinic:  1.  Left temporal bone: Surgical changes of canal wall down mastoidectomy. Peripheral opacification in the mastoidectomy cavity, with possible mild layering fluid posteriorly. Mild opacification in the middle ear cavity. Markedly thickened tympanic membrane. Probable tympanostomy tube (less likely ossicular prosthesis), likely mildly  "abnormally displaced posteriorly and medially. Erosions and abnormal appearance of the ossicles, most pronounced along the incus. Small areas of bony dehiscence along the tegmen mastoideum. 4 mm area of bony dehiscence/bony defect just posterior to the left mastoidectomy cavity, and lateral to the left transverse/sigmoid sinus. Findings are nonspecific and may represent infection or inflammation or possibly cholesteatoma, and may be partially due to postsurgical changes.  2.  Right temporal bone: Mildly thickened tympanic membrane. Otherwise normal.  Status    ENT: Dr. Frake [facility]     Allergies:  Cefixime  Penicillins - rash as child  Suprax [Cephalosporins]      Medications:    Neurontin  Mucinex  Ibuprofen  Levothyroxine  Claritin  Ultram     Past Medical History:    Mastoiditis  Recurrent otitis media  Pyelonephritis   Migraine    Past Surgical History:    Ear surgeries, multiple  Ankle surgery    Tubal ligation  Mastoidectomy     Family History:    Breast cancer     Social History:  Smoking Status: Never Smoker  Alcohol Use: Yes  Patient presents alone.   Marital Status:        Review of Systems   Constitutional: Negative for chills and fever.   HENT: Positive for ear discharge and ear pain. Negative for hearing loss.    Neurological: Positive for headaches.   All other systems reviewed and are negative.    Physical Exam     Patient Vitals for the past 24 hrs:   BP Temp Temp src Pulse Resp SpO2 Height Weight   18 1702 120/76 98.2  F (36.8  C) Oral 86 18 100 % 1.651 m (5' 5\") 91.6 kg (202 lb)        Physical Exam  Nursing notes reviewed. Vitals reviewed.  General: Alert. Well kept.  Eyes:  Conjunctiva non-injected, non-icteric. No nystagmus. EOMI  Neck/Throat: Moist mucous membranes. Normal voice.  Cardiac: Regular rhythm. Normal heart sounds.  Pulmonary: Clear and equal breath sounds bilaterally.   Abdomen: soft and non-tender.  Musculoskeletal: Normal gross range of motion of " all 4 extremities.   Skin: Warm and dry. Normal appearance of visualized exposed skin without rashes or petechiae.  Psych: Affect normal. Good eye contact.  Neurological:  Mental: alert and oriented x 4, follows commands, no aphasia or dysarthria.   Cranial Nerves:  CN II: visual acuity - able to accurately count fingers with each eye. Visual fields intact.  CN III, IV, VI: EOM intact, pupils equal and reactive  CN V: facial sensation nl  CN VII: face symmetric, no facial droop  CN VIII: hearing normal  CN IX: palate elevation symmetric, uvula at midline  CN XI SCM normal, shoulder shrug nl  CN XII: tongue midline  Motor: Strength: 5/5 in all major groups of all extremities. Normal tone. No abnormal movements. No pronator drift b/l.  Sensory: light touch intact.  Gait:  Normal.    Emergency Department Course     Imaging:  Radiographic findings were communicated with the patient who voiced understanding of the findings.    CT-scan Temporal bones w/o contrast:  Right temporal bone:  1. Subtle opacification of the vestibule and cochlea which could  represent labyrinthitis ossificans.  2. Moderate right mastoid effusion.  3. Thickening of the right tympanic membrane.    Left temporal bone:  1. Postoperative changes of previous canal wall down mastoidectomy.  There is opacification of the mastoidectomy cavity particularly along  the superior margin as well as the residual mastoid air cells. The  tegmen mastoideum appears thinned or possibly eroded and a CSF leak  cannot be excluded. Opacification of the mastoidectomy cavity could  represent fluid and/or inflammatory debris. Infection cannot be  excluded. There is also opacification of the middle ear cavity likely  of similar etiology to the opacified mastoid air cells. A tympanostomy  tube is likely present.  2. Bony ossicles appear at least partially eroded.  3. Subtle opacification of the vestibule and cochlea which could  represent labyrinthitis ossificans.  As read  by Radiology.      Laboratory:  CBC: WNL (WBC 6.1, HGB 13.1, )   BMP: WNL (Creatinine 0.76)     Interventions:  1838 - Zofran 4mg IV injection    1838 - Norco 5-325 mg tablet, 1 tablet PO   1839 - NS 1L IV Bolus     Emergency Department Course:  Past medical records, nursing notes, and vitals reviewed.  1653: I performed an exam of the patient and obtained history, as documented above.      IV inserted and blood drawn.     The patient was sent for a CT-scan while in the emergency department, findings above.     2155: I discussed the case with Dr. Moeller of [facility], on call for Dr. Patel, regarding the patient.     2210: I rechecked the patient. Findings and plan explained to the Patient. Patient discharged home with instructions regarding supportive care, medications, and reasons to return. The importance of close follow-up was reviewed.      Impression & Plan      Medical Decision Making:  Lilibeth Santizo is a 33 year old female who presents for evaluation of left ear pain and drainage. On evaluation the patient has a patent left Tympanostomy tube noted with clear-yellowish drainage. She has no tenderness, erythema, or bogginess over the mastoid area. Her cranial nerve and neurologic exam is completely intact. She denies visual changes, fevers, or hearing changes. With concern for return of her mastoiditis, as the patient states her symptoms are similar to previous diagnosis of mastoiditis, I discussed the risks and benefits of CT-scan with the patient and she is requesting CT-scan today. CT of the right temporal bones shows possible labyrinthitis and a right mastoid effusion. She has no tenderness or pain from the right ear or side of her head. The left temporal bones on CT show opacification of the mastoidectomy, thinning of the tegmen mastoideum, and similar opacification of the middle ear cavity with tympanostomy tube. Lab work today is unremarkable. She is afebrile and has a normal WBC. I  discussed the patient s CT scan and presentation with her primary ENT office at [facility], Yosef Moeller. He notes that with the patient s physical exam and imaging, she is appropriate for outpatient follow up. He would like her started on Ciprodex and I will also start Augmentin secondary to the increased drainage for the last two weeks. Dr. Moeller would like her to be seen in their office tomorrow. There is no indication at this point for admission or IV antibiotics. She is appropriate for outpatient follow up. She is not septic, she is nontoxic appearing, smiling, and laughing throughout her time in the Emergency Department. Her pain has been completely controlled with one Norco tablet. She will be given a very limited prescription of pain medication until she is able to follow up with ENT tomorrow. She was given strict return instructions.     Diagnosis:    ICD-10-CM    1. Left ear pain H92.02    2. History of mastoiditis Z86.69      Discharge Medications:  amoxicillin-clavulanate 875-125 MG tablet  Commonly known as:  AUGMENTIN  1 tablet, Oral, 2 TIMES DAILY     ciprofloxacin-dexamethasone 0.3-0.1 % otic suspension  Commonly known as:  CIPRODEX  4 drops, Left Ear, 2 TIMES DAILY     HYDROcodone-acetaminophen 5-325 MG tablet  Commonly known as:  NORCO  1 tablet, Oral, EVERY 6 HOURS PRN          Diogo Ibrahim  12/13/2018    EMERGENCY DEPARTMENT  Diogo AGOSTO am serving as a scribe at 10:18 PM on 12/13/2018 to document services personally performed by Sherry Quinones CNP based on my observations and the provider's statements to me.         Sherry Quinones CNP  12/13/18 2129

## 2020-05-07 ENCOUNTER — DOCUMENTATION ONLY (OUTPATIENT)
Dept: EDUCATION SERVICES | Facility: CLINIC | Age: 35
End: 2020-05-07

## 2021-04-05 ENCOUNTER — IMMUNIZATION (OUTPATIENT)
Dept: NURSING | Facility: CLINIC | Age: 36
End: 2021-04-05
Payer: COMMERCIAL

## 2021-04-05 PROCEDURE — 0001A PR COVID VAC PFIZER DIL RECON 30 MCG/0.3 ML IM: CPT

## 2021-04-05 PROCEDURE — 91300 PR COVID VAC PFIZER DIL RECON 30 MCG/0.3 ML IM: CPT

## 2021-04-26 ENCOUNTER — IMMUNIZATION (OUTPATIENT)
Dept: NURSING | Facility: CLINIC | Age: 36
End: 2021-04-26
Attending: INTERNAL MEDICINE
Payer: COMMERCIAL

## 2021-04-26 PROCEDURE — 0002A PR COVID VAC PFIZER DIL RECON 30 MCG/0.3 ML IM: CPT

## 2021-04-26 PROCEDURE — 91300 PR COVID VAC PFIZER DIL RECON 30 MCG/0.3 ML IM: CPT

## 2021-05-09 ENCOUNTER — HEALTH MAINTENANCE LETTER (OUTPATIENT)
Age: 36
End: 2021-05-09

## 2021-10-24 ENCOUNTER — HEALTH MAINTENANCE LETTER (OUTPATIENT)
Age: 36
End: 2021-10-24

## 2022-06-05 ENCOUNTER — HEALTH MAINTENANCE LETTER (OUTPATIENT)
Age: 37
End: 2022-06-05

## 2022-10-15 ENCOUNTER — HEALTH MAINTENANCE LETTER (OUTPATIENT)
Age: 37
End: 2022-10-15

## 2023-06-11 ENCOUNTER — HEALTH MAINTENANCE LETTER (OUTPATIENT)
Age: 38
End: 2023-06-11

## 2024-03-17 ENCOUNTER — HEALTH MAINTENANCE LETTER (OUTPATIENT)
Age: 39
End: 2024-03-17